# Patient Record
Sex: MALE | Race: WHITE | NOT HISPANIC OR LATINO | Employment: FULL TIME | URBAN - METROPOLITAN AREA
[De-identification: names, ages, dates, MRNs, and addresses within clinical notes are randomized per-mention and may not be internally consistent; named-entity substitution may affect disease eponyms.]

---

## 2017-04-13 ENCOUNTER — ALLSCRIPTS OFFICE VISIT (OUTPATIENT)
Dept: OTHER | Facility: OTHER | Age: 62
End: 2017-04-13

## 2017-04-17 ENCOUNTER — TRANSCRIBE ORDERS (OUTPATIENT)
Dept: ADMINISTRATIVE | Facility: HOSPITAL | Age: 62
End: 2017-04-17

## 2017-04-17 DIAGNOSIS — I25.10 ATHEROSCLEROSIS OF NATIVE CORONARY ARTERY OF NATIVE HEART, ANGINA PRESENCE UNSPECIFIED: Primary | ICD-10-CM

## 2017-05-08 ENCOUNTER — GENERIC CONVERSION - ENCOUNTER (OUTPATIENT)
Dept: OTHER | Facility: OTHER | Age: 62
End: 2017-05-08

## 2017-05-08 ENCOUNTER — HOSPITAL ENCOUNTER (OUTPATIENT)
Dept: NUCLEAR MEDICINE | Facility: HOSPITAL | Age: 62
Discharge: HOME | End: 2017-05-08
Payer: COMMERCIAL

## 2017-05-08 ENCOUNTER — HOSPITAL ENCOUNTER (OUTPATIENT)
Dept: NON INVASIVE DIAGNOSTICS | Facility: HOSPITAL | Age: 62
Discharge: HOME | End: 2017-05-08
Payer: COMMERCIAL

## 2017-05-08 DIAGNOSIS — I25.10 ATHEROSCLEROSIS OF NATIVE CORONARY ARTERY OF NATIVE HEART, ANGINA PRESENCE UNSPECIFIED: ICD-10-CM

## 2017-05-08 PROCEDURE — A9502 TC99M TETROFOSMIN: HCPCS

## 2017-05-08 PROCEDURE — 93017 CV STRESS TEST TRACING ONLY: CPT

## 2017-05-08 PROCEDURE — 78452 HT MUSCLE IMAGE SPECT MULT: CPT

## 2017-10-12 ENCOUNTER — ALLSCRIPTS OFFICE VISIT (OUTPATIENT)
Dept: OTHER | Facility: OTHER | Age: 62
End: 2017-10-12

## 2017-10-12 DIAGNOSIS — E78.5 HYPERLIPIDEMIA: ICD-10-CM

## 2017-10-12 DIAGNOSIS — Z12.5 ENCOUNTER FOR SCREENING FOR MALIGNANT NEOPLASM OF PROSTATE: ICD-10-CM

## 2017-10-12 DIAGNOSIS — I10 ESSENTIAL (PRIMARY) HYPERTENSION: ICD-10-CM

## 2017-10-12 DIAGNOSIS — I25.10 ATHEROSCLEROTIC HEART DISEASE OF NATIVE CORONARY ARTERY WITHOUT ANGINA PECTORIS: ICD-10-CM

## 2017-10-14 NOTE — PROGRESS NOTES
Assessment  1  Hypertension (401 9) (I10)   2  CAD (coronary atherosclerotic disease) (414 00) (I25 10)   3  Hyperlipidemia (272 4) (E78 5)    Plan  CAD (coronary atherosclerotic disease), Hyperlipidemia, Hypertension    · (1) COMPREHENSIVE METABOLIC PANEL; Status:Active; Requested for:12Oct2017;    · (1) LIPID PANEL, FASTING; Status:Active; Requested for:12Oct2017;    · (1) TSH; Status:Active; Requested for:12Oct2017;    · Follow-up Visit in 8 Months Evaluation and Treatment  Follow-up  Status: Hold For -  Scheduling  Requested for: 77OOV4521  Hyperlipidemia    · Simvastatin 80 MG Oral Tablet; TAKE ONE TABLET BY MOUTH ONCE DAILY   · A diet low in sugar may help your condition ; Status:Complete;   Done: 89NYR5235  06:04PM   · Begin or continue regular aerobic exercise  Gradually work up to at least 3 sessions of  30 minutes of exercise a week ; Status:Complete;   Done: 02ZYX0575 06:04PM   · Eat a low fat and low cholesterol diet ; Status:Complete;   Done: 81GPA8911 06:04PM   · Some eating tips that can help you lose weight ; Status:Complete;   Done: 67TLI0149  06:04PM  Hypertension    · Lisinopril 20 MG Oral Tablet; TAKE ONE TABLET BY MOUTH ONCE DAILY  Screening for colorectal cancer    · COLONOSCOPY; Status:Active; Requested for:12Oct2017;   Screening for prostate cancer    · (1) PSA (SCREEN) (Dx V76 44 Screen for Prostate Cancer); Status:Active; Requested  for:12Oct2017;     Discussion/Summary    1  HTN - BP is stable  Continue Lisinopril 20 mg daily  Follow a low-sodium diet, regular exercise  H/o MI in 2004 - patient is asymptomatic, had negative Myoview stress test in 5/17 statin, aspirin therapy  - continue Simvastatin 80 mg daily  Check labs  maintenance - Patient declined Flu shot, Pneumovax  to schedule colonoscopy  Check PSA  followup visit in 8 months     The patient was counseled regarding instructions for management,-- risk factor reductions,-- risks and benefits of treatment options,-- importance of compliance with treatment  total time of encounter was 25 minutes-- and-- 15 minutes was spent counseling  Possible side effects of new medications were reviewed with the patient/guardian today  The treatment plan was reviewed with the patient/guardian  The patient/guardian understands and agrees with the treatment plan      Chief Complaint  Patient presents for a 6 month followp HTN, CAD, Hyperlipidemia  Patient is here today for follow up of chronic conditions described in HPI  History of Present Illness  Patient is 58-year-old male with HTN, Hyperlipidemia, CAD, H/o MI in 2004, cardiac cath with stent placement  presents for 6 month followup visit  feels well  current medications  Patient requesting refills  - BP is stable  Currently taking Lisinopril 20 mg daily  Denies CP, SOB, dizziness  - patient was previously seen by cardiologist Dr Samantha Metzger  stress test done in 5/17: LV ejection fraction 66%, negative for cardiac ischemia  - patient takes Simvastatin 80 mg daily  No side effects from statin therapy Last blood test done in 2/17 at work    had colonoscopy over 10 years ago  family history of colon CA  Flu shot and Pneumovax vaccination today  Review of Systems    Constitutional: no fever,-- no chills-- and-- not feeling tired  Weight has been stable  Eyes: no eye pain,-- no dryness of the eyes,-- eyes not red,-- no purulent discharge from the eyes-- and-- no itching of the eyes  No visual disturbances  ENT: no earache,-- no nosebleeds,-- no sore throat,-- no hearing loss,-- no nasal discharge-- and-- no hoarseness  Cardiovascular: no chest pain,-- no intermittent leg claudication,-- no palpitations-- and-- no extremity edema  Respiratory: no shortness of breath,-- no cough,-- no wheezing-- and-- no shortness of breath during exertion  Gastrointestinal: no abdominal pain,-- no nausea,-- no vomiting,-- no constipation,-- no diarrhea-- and-- no blood in stools  Genitourinary: no dysuria,-- no incontinence-- and-- no nocturia  Musculoskeletal: no arthralgias,-- no joint swelling-- and-- no myalgias  Integumentary: no rashes,-- no itching-- and-- no skin wound  Neurological: no headache,-- no numbness,-- no tingling-- and-- no dizziness  Psychiatric: no anxiety,-- no sleep disturbances-- and-- no depression  Endocrine: no muscle weakness  Hematologic/Lymphatic: No complaints of swollen glands, no swollen glands in the neck, does not bleed easily, no easy bruising  Active Problems  1  CAD (coronary atherosclerotic disease) (414 00) (I25 10)   2  CAD S/P percutaneous coronary angioplasty (414 01,V45 82) (I25 10,Z98 61)   3  Disc degeneration, lumbar (722 52) (M51 36)   4  Hyperlipidemia (272 4) (E78 5)   5  Hypertension (401 9) (I10)   6  Lyme disease (088 81) (A69 20)   7  Screening for colorectal cancer (V76 51) (Z12 11,Z12 12)   8  Screening for prostate cancer (V76 44) (Z12 5)    Past Medical History  1  History of Acute Myocardial Infarction (V12 59)   2  History of Cough (786 2) (R05)   3  History of Hip pain, unspecified laterality   4  History of bronchitis (V12 69) (Z87 09)   5  History of eczema (V13 3) (Z87 2)   6  History of low back pain (V13 59) (Z87 39)   7  History of sciatica (V12 49) (Z86 69)   8  History of Lumbar radiculopathy (724 4) (M54 16)    The active problems and past medical history were reviewed and updated today  Surgical History  1  History of Appendectomy   2  History of Cath Stent Placement   3  History of Septoplasty    The surgical history was reviewed and updated today  Family History  Mother    1  Family history of Arthritis (V17 7)  Father    2  Family history of Dementia   3  Family history of Hypertension (V17 49)   4  Family history of Prostate Cancer (V16 42)   5  Family history of Stroke Syndrome (V17 1)  Maternal Uncle    6   Family history of coronary artery disease (V17 3) (Z82 49)    The family history was reviewed and updated today  Social History   · Being A Social Drinker   · Never A Smoker  The social history was reviewed and updated today  Current Meds   1  Aspirin 81 MG TABS; Therapy: (Recorded:13Apr2017) to Recorded   2  Lisinopril 20 MG Oral Tablet; TAKE ONE TABLET BY MOUTH ONCE DAILY; Therapy: 97CLH8989 to (Evaluate:14Oct2017)  Requested for: 13Apr2017; Last   Rx:[id number] Ordered   3  Multivital TABS; take 1 tablet by mouth daily; Therapy: 50RZG3148 to Recorded   4  Nitrostat 0 4 MG Sublingual Tablet Sublingual; PLACE 1 TABLET UNDER THE TONGUE   EVERY 5 MINUTES UP TO 3 DOSES AS NEEDED FOR CHEST PAIN;   Therapy: 17BJA0169 to (Evaluate:48Vun6203)  Requested for: 54TTR9656; Last   Rx:60Msm3600 Ordered   5  Simvastatin 80 MG Oral Tablet; TAKE ONE TABLET BY MOUTH ONCE DAILY; Therapy: 95WRK1147 to (Evaluate:14Oct2017)  Requested for: 13Apr2017; Last   Rx:[id number] Ordered    The medication list was reviewed and updated today  Allergies  1  Daypro TABS    Vitals  Vital Signs    Recorded: 91HBE6045 06:02PM Recorded: 07LFR0314 05:35PM   Temperature  98 F, Tympanic   Heart Rate  68, L Radial   Pulse Quality  Normal, L Radial   Respiration Quality  Normal   Respiration  16   Systolic 559 415, LUE, Sitting   Diastolic 78 74, LUE, Sitting   Height  5 ft 5 5 in   Weight  196 lb 2 oz   BMI Calculated  32 14   BSA Calculated  1 97   Pain Scale  0     Physical Exam    Constitutional   General appearance: No acute distress, well appearing and well nourished  -- Obese  Eyes   Conjunctiva and lids: No swelling, erythema, or discharge  Pupils and irises: Equal, round and reactive to light  Ears, Nose, Mouth, and Throat   External inspection of ears and nose: Normal     Otoscopic examination: Tympanic membrance translucent with normal light reflex  Canals patent without erythema  Oropharynx: Normal with no erythema, edema, exudate or lesions      Pulmonary   Respiratory effort: No increased work of breathing or signs of respiratory distress  Auscultation of lungs: Clear to auscultation, equal breath sounds bilaterally, no wheezes, no rales, no rhonci  Cardiovascular   Auscultation of heart: Normal rate and rhythm, normal S1 and S2, without murmurs  Examination of extremities for edema and/or varicosities: Normal     Carotid pulses: Normal  -- no carotid bruits  Abdomen   Abdomen: Non-tender, no masses  -- no abdominal bruits  Liver and spleen: No hepatomegaly or splenomegaly  Musculoskeletal   Gait and station: Normal     Digits and nails: Normal without clubbing or cyanosis  Inspection/palpation of joints, bones, and muscles: Normal     Skin   Skin and subcutaneous tissue: Normal without rashes or lesions      Psychiatric   Mood and affect: Normal          Results/Data  *VB-Depression Screening 58LDO4820 05:42PM Markus Godoy     Test Name Result Flag Reference   Depression Scale Result      Depression Screen - Negative For Symptoms       Signatures   Electronically signed by : RONA Vargas ; Oct 12 2017  6:07PM EST                       (Author)

## 2018-01-10 NOTE — RESULT NOTES
Verified Results  * NM MYOCARDIAL PERFUSION SPECT (STRESS AND/OR REST) [id number] 08:03AM [place]     Test Name Result Flag Reference   NM MYOCARDIAL PERFUSION SPECT (STRESS AND/OR REST) (Report)     [address]   [address]  [place], [address]   [phone]     Rest/Stress Gated SPECT Myocardial Perfusion Imaging After Exercise     Name: Gibran Alva   MR #: [id number]   Account #: [de-identified]   Study date: 2017   : 1955   Age: 64 years   Gender: Male   Height: 65 in   Weight: 196 lb   BSA: 1 96 m squared     Allergies: OXAPROZIN     Diagnosis: I25 10 - Atherosclerotic heart disease of native coronary artery without angina pectoris     Primary Physician: Krystal Hu MD   Technician: Nick Tavarez   RN: Rona Vail RN BSN   Referring Physician: Krystal Hu MD   [facility]   Report Prepared By[de-identified] Rona Vail RN BSN   Interpreting Physician: Camille Reyes DO     CLINICAL QUESTION: Evaluation of known coronary artery disease  HISTORY: The patient is a 64year old  male  Chest pain status: no chest pain  Coronary artery disease risk factors: dyslipidemia, hypertension, and family history of premature coronary artery disease  Cardiovascular history:   coronary artery disease and prior myocardial infarction  Prior cardiovascular procedures: percutaneous transluminal coronary angioplasty (on )  Medications: a nitrate, an ACE inhibitor/ARB, aspirin, and a lipid lowering agent  PHYSICAL EXAM: Baseline physical exam screening: normal lung exam      REST ECG: Normal sinus rhythm  Normal baseline ECG  PROCEDURE: Treadmill exercise testing was performed, using the Willie protocol  Gated SPECT myocardial perfusion imaging was performed after stress and at rest  Systolic blood pressure was 122 mmHg, at the start of the study  Diastolic   blood pressure was 65 mmHg, at the start of the study  The heart rate was 75 bpm, at the start of the study  IV double checked  OZIEL PROTOCOL:   HR bpm SBP mmHg DBP mmHg Symptoms   Baseline 75 122 65 none   Stage 1 106 142 57 none   Stage 2 118 151 62 none   Stage 3 136 157 65 mild dyspnea   Stage 4 142 169 58 fatigue   Recovery 1 111 178 53 subsiding   Recovery 2 95 153 60 none   No medications or fluids given  STRESS SUMMARY: Duration of exercise was 9 min and 30 sec  The patient exercised to protocol stage 4  Maximal work rate was 10 8 METs  Maximal heart rate during stress was 142 bpm ( 89 % of maximal predicted heart rate)  The heart rate   response to stress was normal  There was normal resting blood pressure with an appropriate response to stress  The rate-pressure product for the peak heart rate and blood pressure was 45721  There was no chest pain during stress  The   stress test was terminated due to achievement of maximal (symptom limited) exercise and achievement of target heart rate  Pre oxygen saturation: 99 %  Peak oxygen saturation: 95 %  The stress ECG was negative for ischemia  There were no   stress arrhythmias or conduction abnormalities  ISOTOPE ADMINISTRATION:   Radiopharmaceutical was administered 8 min, 30 sec into the stress protocol  There was 1 min of exercise after the injection  MYOCARDIAL PERFUSION IMAGING:   The image quality was fair  Left ventricular size was normal  The TID ratio was 1 01  PERFUSION DEFECTS:   - There were no perfusion defects  GATED SPECT:   The calculated left ventricular ejection fraction was 66 %  Left ventricular ejection fraction was within normal limits by visual estimate  There was no left ventricular regional abnormality  SUMMARY:   - Stress results: Duration of exercise was 9 min and 30 sec  Target heart rate was achieved  There was no chest pain during stress  - ECG conclusions: The stress ECG was negative for ischemia  - Perfusion imaging: There were no perfusion defects     - Gated SPECT: The calculated left ventricular ejection fraction was 66 %  Left ventricular ejection fraction was within normal limits by visual estimate  There was no left ventricular regional abnormality  IMPRESSIONS: Normal study after maximal exercise  Myocardial perfusion imaging was normal at rest and with stress   Left ventricular systolic function was normal      Prepared and signed by     Nessa Agustin DO   Signed 05/08/2017 15:54:47

## 2018-01-14 VITALS
WEIGHT: 196.13 LBS | DIASTOLIC BLOOD PRESSURE: 78 MMHG | TEMPERATURE: 98 F | HEART RATE: 68 BPM | HEIGHT: 66 IN | BODY MASS INDEX: 31.52 KG/M2 | SYSTOLIC BLOOD PRESSURE: 132 MMHG | RESPIRATION RATE: 16 BRPM

## 2018-01-14 VITALS
SYSTOLIC BLOOD PRESSURE: 130 MMHG | HEART RATE: 78 BPM | TEMPERATURE: 97.6 F | DIASTOLIC BLOOD PRESSURE: 80 MMHG | RESPIRATION RATE: 16 BRPM | HEIGHT: 66 IN | WEIGHT: 196 LBS | BODY MASS INDEX: 31.5 KG/M2

## 2018-06-14 ENCOUNTER — OFFICE VISIT (OUTPATIENT)
Dept: FAMILY MEDICINE CLINIC | Facility: CLINIC | Age: 63
End: 2018-06-14
Payer: COMMERCIAL

## 2018-06-14 VITALS
WEIGHT: 194 LBS | HEART RATE: 72 BPM | DIASTOLIC BLOOD PRESSURE: 72 MMHG | RESPIRATION RATE: 16 BRPM | SYSTOLIC BLOOD PRESSURE: 126 MMHG | TEMPERATURE: 98 F | BODY MASS INDEX: 31.18 KG/M2 | HEIGHT: 66 IN

## 2018-06-14 DIAGNOSIS — Z98.61 CAD S/P PERCUTANEOUS CORONARY ANGIOPLASTY: ICD-10-CM

## 2018-06-14 DIAGNOSIS — E78.2 MIXED HYPERLIPIDEMIA: ICD-10-CM

## 2018-06-14 DIAGNOSIS — Z12.5 SCREENING FOR PROSTATE CANCER: ICD-10-CM

## 2018-06-14 DIAGNOSIS — Z12.11 SCREENING FOR COLON CANCER: ICD-10-CM

## 2018-06-14 DIAGNOSIS — I25.10 CAD S/P PERCUTANEOUS CORONARY ANGIOPLASTY: ICD-10-CM

## 2018-06-14 DIAGNOSIS — I10 ESSENTIAL HYPERTENSION: Primary | ICD-10-CM

## 2018-06-14 PROCEDURE — 3008F BODY MASS INDEX DOCD: CPT | Performed by: FAMILY MEDICINE

## 2018-06-14 PROCEDURE — 99214 OFFICE O/P EST MOD 30 MIN: CPT | Performed by: FAMILY MEDICINE

## 2018-06-14 PROCEDURE — 1036F TOBACCO NON-USER: CPT | Performed by: FAMILY MEDICINE

## 2018-06-14 PROCEDURE — 3078F DIAST BP <80 MM HG: CPT | Performed by: FAMILY MEDICINE

## 2018-06-14 PROCEDURE — 3074F SYST BP LT 130 MM HG: CPT | Performed by: FAMILY MEDICINE

## 2018-06-14 RX ORDER — NITROGLYCERIN 0.4 MG/1
1 TABLET SUBLINGUAL
COMMUNITY
Start: 2013-05-21

## 2018-06-14 RX ORDER — SIMVASTATIN 80 MG
1 TABLET ORAL DAILY
COMMUNITY
Start: 2011-06-14 | End: 2018-11-26 | Stop reason: SDUPTHER

## 2018-06-14 RX ORDER — LISINOPRIL 20 MG/1
1 TABLET ORAL DAILY
COMMUNITY
Start: 2011-07-25 | End: 2018-11-26 | Stop reason: SDUPTHER

## 2018-06-14 NOTE — ASSESSMENT & PLAN NOTE
Continue Simvastatin 80 mg daily  Check CMP, lipid panel  Follow a low-cholesterol diet, regular exercise

## 2018-06-14 NOTE — ASSESSMENT & PLAN NOTE
Patient is asymptomatic  Myoview sterss  test done in May 2017 showed no evidence of cardiac ischemia  Continue aspirin 81 mg daily, statin

## 2018-11-26 DIAGNOSIS — E78.2 MIXED HYPERLIPIDEMIA: Primary | ICD-10-CM

## 2018-11-26 DIAGNOSIS — I10 ESSENTIAL HYPERTENSION: ICD-10-CM

## 2018-11-26 RX ORDER — LISINOPRIL 20 MG/1
20 TABLET ORAL DAILY
Qty: 90 TABLET | Refills: 2 | Status: SHIPPED | OUTPATIENT
Start: 2018-11-26 | End: 2019-08-06 | Stop reason: SDUPTHER

## 2018-11-26 RX ORDER — SIMVASTATIN 80 MG
80 TABLET ORAL DAILY
Qty: 90 TABLET | Refills: 2 | Status: SHIPPED | OUTPATIENT
Start: 2018-11-26 | End: 2019-08-06 | Stop reason: SDUPTHER

## 2018-11-26 NOTE — TELEPHONE ENCOUNTER
Refill Lisinopril 20 mg  1 daily  #90           And Simvastatin 80 m g 1 daily #90  [facility]

## 2019-03-25 ENCOUNTER — TELEPHONE (OUTPATIENT)
Dept: FAMILY MEDICINE CLINIC | Facility: CLINIC | Age: 64
End: 2019-03-25

## 2019-03-25 DIAGNOSIS — I25.10 CAD S/P PERCUTANEOUS CORONARY ANGIOPLASTY: Primary | ICD-10-CM

## 2019-03-25 DIAGNOSIS — Z98.61 CAD S/P PERCUTANEOUS CORONARY ANGIOPLASTY: Primary | ICD-10-CM

## 2019-03-25 NOTE — TELEPHONE ENCOUNTER
Left message letting patient know order was placed  Mailed slip to him, and asked him to call us back to let us know if authorization is required so we can start it

## 2019-03-25 NOTE — TELEPHONE ENCOUNTER
Patient requests order for stress test  States he is to have one every 2 years  Cardiologist was Dr Barrington Foss but told to have PCP manage

## 2019-03-25 NOTE — TELEPHONE ENCOUNTER
Please call patient  Order placed in chart for nuclear stress test   Check if patient needs prior authorization

## 2019-04-01 ENCOUNTER — TELEPHONE (OUTPATIENT)
Dept: FAMILY MEDICINE CLINIC | Facility: CLINIC | Age: 64
End: 2019-04-01

## 2019-06-20 ENCOUNTER — OFFICE VISIT (OUTPATIENT)
Dept: FAMILY MEDICINE CLINIC | Facility: CLINIC | Age: 64
End: 2019-06-20
Payer: COMMERCIAL

## 2019-06-20 VITALS
HEART RATE: 76 BPM | WEIGHT: 188.4 LBS | BODY MASS INDEX: 30.41 KG/M2 | DIASTOLIC BLOOD PRESSURE: 70 MMHG | RESPIRATION RATE: 16 BRPM | SYSTOLIC BLOOD PRESSURE: 130 MMHG | TEMPERATURE: 99.5 F | OXYGEN SATURATION: 97 %

## 2019-06-20 DIAGNOSIS — I25.10 CAD S/P PERCUTANEOUS CORONARY ANGIOPLASTY: ICD-10-CM

## 2019-06-20 DIAGNOSIS — E66.9 MILD OBESITY: ICD-10-CM

## 2019-06-20 DIAGNOSIS — Z91.89 ENCOUNTER FOR HEPATITIS C VIRUS SCREENING TEST FOR HIGH RISK PATIENT: ICD-10-CM

## 2019-06-20 DIAGNOSIS — E78.2 MIXED HYPERLIPIDEMIA: ICD-10-CM

## 2019-06-20 DIAGNOSIS — I10 ESSENTIAL HYPERTENSION: Primary | ICD-10-CM

## 2019-06-20 DIAGNOSIS — Z11.59 ENCOUNTER FOR HEPATITIS C VIRUS SCREENING TEST FOR HIGH RISK PATIENT: ICD-10-CM

## 2019-06-20 DIAGNOSIS — Z12.5 SCREENING FOR PROSTATE CANCER: ICD-10-CM

## 2019-06-20 DIAGNOSIS — Z12.11 SCREENING FOR COLON CANCER: ICD-10-CM

## 2019-06-20 DIAGNOSIS — Z98.61 CAD S/P PERCUTANEOUS CORONARY ANGIOPLASTY: ICD-10-CM

## 2019-06-20 PROBLEM — E66.811 OBESITY (BMI 30.0-34.9): Status: ACTIVE | Noted: 2019-06-20

## 2019-06-20 PROCEDURE — 1036F TOBACCO NON-USER: CPT | Performed by: FAMILY MEDICINE

## 2019-06-20 PROCEDURE — 99214 OFFICE O/P EST MOD 30 MIN: CPT | Performed by: FAMILY MEDICINE

## 2019-06-20 PROCEDURE — 3078F DIAST BP <80 MM HG: CPT | Performed by: FAMILY MEDICINE

## 2019-06-20 PROCEDURE — 3075F SYST BP GE 130 - 139MM HG: CPT | Performed by: FAMILY MEDICINE

## 2019-08-06 DIAGNOSIS — I10 ESSENTIAL HYPERTENSION: ICD-10-CM

## 2019-08-06 DIAGNOSIS — E78.2 MIXED HYPERLIPIDEMIA: ICD-10-CM

## 2019-08-06 RX ORDER — SIMVASTATIN 80 MG
TABLET ORAL
Qty: 90 TABLET | Refills: 0 | Status: SHIPPED | OUTPATIENT
Start: 2019-08-06

## 2019-08-06 RX ORDER — LISINOPRIL 20 MG/1
TABLET ORAL
Qty: 90 TABLET | Refills: 0 | Status: SHIPPED | OUTPATIENT
Start: 2019-08-06 | End: 2019-11-21 | Stop reason: SDUPTHER

## 2019-08-07 DIAGNOSIS — I10 ESSENTIAL HYPERTENSION: ICD-10-CM

## 2019-08-07 DIAGNOSIS — E78.2 MIXED HYPERLIPIDEMIA: ICD-10-CM

## 2019-08-07 RX ORDER — LISINOPRIL 20 MG/1
TABLET ORAL
Qty: 90 TABLET | Refills: 2 | OUTPATIENT
Start: 2019-08-07

## 2019-08-07 RX ORDER — SIMVASTATIN 80 MG
TABLET ORAL
Qty: 90 TABLET | Refills: 2 | OUTPATIENT
Start: 2019-08-07

## 2019-11-21 DIAGNOSIS — I10 ESSENTIAL HYPERTENSION: ICD-10-CM

## 2019-11-21 RX ORDER — LISINOPRIL 20 MG/1
20 TABLET ORAL DAILY
Qty: 90 TABLET | Refills: 3 | Status: SHIPPED | OUTPATIENT
Start: 2019-11-21 | End: 2020-11-16

## 2019-11-21 NOTE — TELEPHONE ENCOUNTER
Patient is requesting a refill of lisinopril (ZESTRIL) 20 mg tablet, take 1 daily, 90-day supply to [facility]  Patient can be contacted at [phone] with any questions

## 2020-03-31 ENCOUNTER — TELEPHONE (OUTPATIENT)
Dept: GASTROENTEROLOGY | Facility: MEDICAL CENTER | Age: 65
End: 2020-03-31

## 2020-06-09 ENCOUNTER — CONSULT (OUTPATIENT)
Dept: GASTROENTEROLOGY | Facility: MEDICAL CENTER | Age: 65
End: 2020-06-09
Payer: COMMERCIAL

## 2020-06-09 VITALS
HEART RATE: 80 BPM | HEIGHT: 66 IN | SYSTOLIC BLOOD PRESSURE: 148 MMHG | DIASTOLIC BLOOD PRESSURE: 85 MMHG | WEIGHT: 187 LBS | BODY MASS INDEX: 30.05 KG/M2 | TEMPERATURE: 98.8 F

## 2020-06-09 DIAGNOSIS — Z11.59 SPECIAL SCREENING EXAMINATION FOR UNSPECIFIED VIRAL DISEASE: ICD-10-CM

## 2020-06-09 DIAGNOSIS — I10 ESSENTIAL HYPERTENSION: ICD-10-CM

## 2020-06-09 DIAGNOSIS — I25.10 CAD S/P PERCUTANEOUS CORONARY ANGIOPLASTY: ICD-10-CM

## 2020-06-09 DIAGNOSIS — Z12.11 SCREENING FOR COLON CANCER: Primary | ICD-10-CM

## 2020-06-09 DIAGNOSIS — Z98.61 CAD S/P PERCUTANEOUS CORONARY ANGIOPLASTY: ICD-10-CM

## 2020-06-09 PROCEDURE — 99243 OFF/OP CNSLTJ NEW/EST LOW 30: CPT | Performed by: INTERNAL MEDICINE

## 2020-06-09 PROCEDURE — 3008F BODY MASS INDEX DOCD: CPT | Performed by: INTERNAL MEDICINE

## 2020-06-09 PROCEDURE — 3077F SYST BP >= 140 MM HG: CPT | Performed by: INTERNAL MEDICINE

## 2020-06-09 PROCEDURE — 3079F DIAST BP 80-89 MM HG: CPT | Performed by: INTERNAL MEDICINE

## 2020-06-11 PROBLEM — Z12.11 SCREENING FOR COLON CANCER: Status: ACTIVE | Noted: 2020-06-11

## 2020-06-11 PROBLEM — Z11.59 SPECIAL SCREENING EXAMINATION FOR UNSPECIFIED VIRAL DISEASE: Status: ACTIVE | Noted: 2020-06-11

## 2020-06-29 ENCOUNTER — TELEPHONE (OUTPATIENT)
Dept: GASTROENTEROLOGY | Facility: MEDICAL CENTER | Age: 65
End: 2020-06-29

## 2020-07-09 PROBLEM — Z00.00 WELL ADULT EXAM: Status: ACTIVE | Noted: 2020-06-11

## 2020-07-22 DIAGNOSIS — Z12.11 SCREENING FOR COLON CANCER: ICD-10-CM

## 2020-07-22 DIAGNOSIS — Z11.59 SPECIAL SCREENING EXAMINATION FOR UNSPECIFIED VIRAL DISEASE: ICD-10-CM

## 2020-07-22 PROCEDURE — U0003 INFECTIOUS AGENT DETECTION BY NUCLEIC ACID (DNA OR RNA); SEVERE ACUTE RESPIRATORY SYNDROME CORONAVIRUS 2 (SARS-COV-2) (CORONAVIRUS DISEASE [COVID-19]), AMPLIFIED PROBE TECHNIQUE, MAKING USE OF HIGH THROUGHPUT TECHNOLOGIES AS DESCRIBED BY CMS-2020-01-R: HCPCS

## 2020-07-23 LAB — SARS-COV-2 RNA SPEC QL NAA+PROBE: NOT DETECTED

## 2020-07-28 ENCOUNTER — ANESTHESIA EVENT (OUTPATIENT)
Dept: GASTROENTEROLOGY | Facility: MEDICAL CENTER | Age: 65
End: 2020-07-28

## 2020-07-30 ENCOUNTER — ANESTHESIA (OUTPATIENT)
Dept: GASTROENTEROLOGY | Facility: MEDICAL CENTER | Age: 65
End: 2020-07-30

## 2020-07-30 ENCOUNTER — HOSPITAL ENCOUNTER (OUTPATIENT)
Dept: GASTROENTEROLOGY | Facility: MEDICAL CENTER | Age: 65
Setting detail: OUTPATIENT SURGERY
Discharge: HOME | End: 2020-07-30
Attending: INTERNAL MEDICINE
Payer: COMMERCIAL

## 2020-07-30 VITALS
BODY MASS INDEX: 30.53 KG/M2 | HEART RATE: 65 BPM | RESPIRATION RATE: 16 BRPM | HEIGHT: 66 IN | DIASTOLIC BLOOD PRESSURE: 74 MMHG | WEIGHT: 190 LBS | OXYGEN SATURATION: 97 % | TEMPERATURE: 98.4 F | SYSTOLIC BLOOD PRESSURE: 132 MMHG

## 2020-07-30 DIAGNOSIS — Z12.11 SCREENING FOR COLON CANCER: ICD-10-CM

## 2020-07-30 DIAGNOSIS — Z11.59 SPECIAL SCREENING EXAMINATION FOR UNSPECIFIED VIRAL DISEASE: ICD-10-CM

## 2020-07-30 PROCEDURE — 45385 COLONOSCOPY W/LESION REMOVAL: CPT | Performed by: INTERNAL MEDICINE

## 2020-07-30 PROCEDURE — 88342 IMHCHEM/IMCYTCHM 1ST ANTB: CPT | Performed by: PATHOLOGY

## 2020-07-30 PROCEDURE — 88305 TISSUE EXAM BY PATHOLOGIST: CPT | Performed by: PATHOLOGY

## 2020-07-30 RX ORDER — SODIUM CHLORIDE 9 MG/ML
125 INJECTION, SOLUTION INTRAVENOUS CONTINUOUS
Status: DISCONTINUED | OUTPATIENT
Start: 2020-07-30 | End: 2020-08-03 | Stop reason: HOSPADM

## 2020-07-30 RX ORDER — PROPOFOL 10 MG/ML
INJECTION, EMULSION INTRAVENOUS AS NEEDED
Status: DISCONTINUED | OUTPATIENT
Start: 2020-07-30 | End: 2020-07-30 | Stop reason: SURG

## 2020-07-30 RX ORDER — PROPOFOL 10 MG/ML
INJECTION, EMULSION INTRAVENOUS CONTINUOUS PRN
Status: DISCONTINUED | OUTPATIENT
Start: 2020-07-30 | End: 2020-07-30 | Stop reason: SURG

## 2020-07-30 RX ORDER — LIDOCAINE HYDROCHLORIDE 20 MG/ML
INJECTION, SOLUTION EPIDURAL; INFILTRATION; INTRACAUDAL; PERINEURAL AS NEEDED
Status: DISCONTINUED | OUTPATIENT
Start: 2020-07-30 | End: 2020-07-30 | Stop reason: SURG

## 2020-07-30 RX ADMIN — LIDOCAINE HYDROCHLORIDE 5 ML: 20 INJECTION, SOLUTION EPIDURAL; INFILTRATION; INTRACAUDAL at 14:01

## 2020-07-30 RX ADMIN — SODIUM CHLORIDE 125 ML/HR: 0.9 INJECTION, SOLUTION INTRAVENOUS at 14:07

## 2020-07-30 RX ADMIN — Medication 40 MG: at 14:17

## 2020-07-30 RX ADMIN — PROPOFOL 120 MG: 10 INJECTION, EMULSION INTRAVENOUS at 14:01

## 2020-07-30 RX ADMIN — PROPOFOL 150 MCG/KG/MIN: 10 INJECTION, EMULSION INTRAVENOUS at 14:01

## 2020-07-30 NOTE — ANESTHESIA POSTPROCEDURE EVALUATION
Post-Op Assessment Note    CV Status:  Stable    Pain management: adequate     Mental Status:  Alert and awake   Hydration Status:  Euvolemic   PONV Controlled:  Controlled   Airway Patency:  Patent   Post Op Vitals Reviewed: Yes      Staff: Anesthesiologist           /74 (07/30/20 1436)    Temp      Pulse 65 (07/30/20 1436)   Resp 16 (07/30/20 1436)    SpO2 97 % (07/30/20 1436)

## 2020-07-30 NOTE — ANESTHESIA PREPROCEDURE EVALUATION
Review of Systems/Medical History  Patient summary reviewed  Chart reviewed      Cardiovascular  Hyperlipidemia, Hypertension , Past MI , CAD ,    Pulmonary  Smoker ex-smoker  ,        GI/Hepatic    Bowel prep            Endo/Other     GYN       Hematology  Negative hematology ROS      Musculoskeletal  Negative musculoskeletal ROS        Neurology  Negative neurology ROS      Psychology   Negative psychology ROS              Physical Exam    Airway    Mallampati score: I  TM Distance: <3 FB  Neck ROM: full     Dental       Cardiovascular  Rhythm: regular, Rate: normal, Cardiovascular exam normal    Pulmonary  Pulmonary exam normal     Other Findings        Anesthesia Plan  ASA Score- 3     Anesthesia Type- IV sedation with anesthesia with ASA Monitors  Additional Monitors:   Airway Plan:         Plan Factors- Patient instructed to abstain from smoking on day of procedure  Patient did not smoke on day of surgery  Induction- intravenous  Postoperative Plan-     Informed Consent- Anesthetic plan and risks discussed with patient

## 2020-07-30 NOTE — DISCHARGE INSTRUCTIONS
Colonoscopy   WHAT YOU NEED TO KNOW:   A colonoscopy is a procedure to examine the inside of your colon (intestine) with a scope  Polyps or tissue growths may have been removed during your colonoscopy  It is normal to feel bloated and to have some abdominal discomfort  You should be passing gas  If you have hemorrhoids or you had polyps removed, you may have a small amount of bleeding  DISCHARGE INSTRUCTIONS:   Seek care immediately if:   · You have a large amount of bright red blood in your bowel movements  · Your abdomen is hard and firm and you have severe pain  · You have sudden trouble breathing  Contact your healthcare provider if:   · You develop a rash or hives  · You have a fever within 24 hours of your procedure       · You have not had a bowel movement for 3 days after your procedure  · You have questions or concerns about your condition or care  Activity:   · Do not lift, strain, or run  for 3 days after your procedure  · Rest after your procedure  You have been given medicine to relax you  Do not  drive or make important decisions until the day after your procedure  Return to your normal activity as directed  · Relieve gas and discomfort from bloating  by lying on your right side with a heating pad on your abdomen  You may need to take short walks to help the gas move out  Eat small meals until bloating is relieved  If you had polyps removed: For 7 days after your procedure:  · Do not  take aspirin  · Do not  go on long car rides  Follow up with your healthcare provider as directed:  Write down your questions so you remember to ask them during your visits  © 2017 [address] [employer] [address] [employer] is for End User's use only and may not be sold, redistributed or otherwise used for commercial purposes  All illustrations and images included in CareNotes® are the copyrighted property of [employer]  or Riccardo Pelayo    The above information is an  only  It is not intended as medical advice for individual conditions or treatments  Talk to your doctor, nurse or pharmacist before following any medical regimen to see if it is safe and effective for you  Colorectal Polyps   WHAT YOU NEED TO KNOW:   Colorectal polyps are small growths of tissue in the lining of the colon and rectum  Most polyps are hyperplastic polyps and are usually benign (noncancerous)  Certain types of polyps, called adenomatous polyps, may turn into cancer  DISCHARGE INSTRUCTIONS:   Follow up with your healthcare provider or gastroenterologist as directed: You may need to return for more tests, such as another colonoscopy  Write down your questions so you remember to ask them during your visits  Reduce your risk for colorectal polyps:   · Eat a variety of healthy foods:  Healthy foods include fruit, vegetables, whole-grain breads, low-fat dairy products, beans, lean meat, and fish  Ask if you need to be on a special diet  · Maintain a healthy weight:  Ask your healthcare provider if you need to lose weight and how much you need to lose  Ask for help with a weight loss program     · Exercise:  Begin to exercise slowly and do more as you get stronger  Talk with your healthcare provider before you start an exercise program      · Limit alcohol:  Your risk for polyps increases the more you drink  · Do not smoke: If you smoke, it is never too late to quit  Ask for information about how to stop  For support and more information:   · Jaron Birmingham ([facility]) [address], [place] [address]  Phone: 0- 644 - [phone]  Web Address: www digestive  niddk nih gov  Contact your healthcare provider or gastroenterologist if:   · You have a fever  · You have chills, a cough, or feel weak and achy  · You have abdominal pain that does not go away or gets worse after you take medicine  · Your abdomen is swollen  · You are losing weight without trying  · You have questions or concerns about your condition or care  Seek care immediately or call 911 if:   · You have sudden shortness of breath  · You have a fast heart rate, fast breathing, or are too dizzy to stand up  · You have severe abdominal pain  · You see blood in your bowel movement  © 2017 [address] [employer] Information is for End User's use only and may not be sold, redistributed or otherwise used for commercial purposes  All illustrations and images included in CareNotes® are the copyrighted property of [employer]  or Riccardo Pelayo  The above information is an  only  It is not intended as medical advice for individual conditions or treatments  Talk to your doctor, nurse or pharmacist before following any medical regimen to see if it is safe and effective for you

## 2020-07-30 NOTE — H&P
History and Physical -  [facility]  Quirino Zhang 59 y o  male MRN: [id number]                  HPI: Quirino Zhang is a 59y o  year old male who presents for screening colonoscopy  REVIEW OF SYSTEMS: Per the HPI, and otherwise unremarkable  Historical Information   Past Medical History:   Diagnosis Date    Acute myocardial infarction ([facility] [address] )     CAD (coronary artery disease)     Coronary artery disease     Hyperlipidemia     Hypertension     Lumbar radiculopathy     Right L1-2 and L2-3 Last assessed: 2/4/13    Myocardial infarction [facility]) 2004    Sciatica     Last assessed: 11/5/12     Past Surgical History:   Procedure Laterality Date    APPENDECTOMY      Resolved: 1974    COLONOSCOPY      CORONARY ANGIOPLASTY WITH STENT PLACEMENT      Resolved: 2004    SEPTOPLASTY      Resolved: 12     Social History   Social History     Substance and Sexual Activity   Alcohol Use Yes    Comment: Social     Social History     Substance and Sexual Activity   Drug Use No     Social History     Tobacco Use   Smoking Status Former Smoker   Smokeless Tobacco Never Used     Family History   Problem Relation Age of Onset    Arthritis Mother     Dementia Father     Hypertension Father     Prostate cancer Father     Stroke Father         Syndrome    Coronary artery disease Maternal Uncle        Meds/Allergies       (Not in a hospital admission)    Allergies   Allergen Reactions    Daypro [Oxaprozin]        Objective     /86   Pulse 75   Temp 98 4 °F (36 9 °C) (Temporal)   Resp 16   Ht 5' 6" (1 676 m)   Wt 86 2 kg (190 lb)   SpO2 97%   BMI 30 67 kg/m²       PHYSICAL EXAM    Gen: NAD  CV: RRR  CHEST: Clear  ABD: soft, NT/ND  EXT: no edema      ASSESSMENT/PLAN:  This is a 59y o  year old male here for screening colonoscopy and he is stable and optimized for his procedure

## 2020-11-14 DIAGNOSIS — I10 ESSENTIAL HYPERTENSION: ICD-10-CM

## 2020-11-16 RX ORDER — LISINOPRIL 20 MG/1
TABLET ORAL
Qty: 90 TABLET | Refills: 3 | Status: SHIPPED | OUTPATIENT
Start: 2020-11-16 | End: 2021-11-10

## 2021-03-22 ENCOUNTER — TELEPHONE (OUTPATIENT)
Dept: FAMILY MEDICINE CLINIC | Facility: CLINIC | Age: 66
End: 2021-03-22

## 2021-03-22 DIAGNOSIS — B34.9 VIRAL INFECTION, UNSPECIFIED: ICD-10-CM

## 2021-03-22 DIAGNOSIS — B34.9 VIRAL INFECTION, UNSPECIFIED: Primary | ICD-10-CM

## 2021-03-22 PROCEDURE — U0003 INFECTIOUS AGENT DETECTION BY NUCLEIC ACID (DNA OR RNA); SEVERE ACUTE RESPIRATORY SYNDROME CORONAVIRUS 2 (SARS-COV-2) (CORONAVIRUS DISEASE [COVID-19]), AMPLIFIED PROBE TECHNIQUE, MAKING USE OF HIGH THROUGHPUT TECHNOLOGIES AS DESCRIBED BY CMS-2020-01-R: HCPCS | Performed by: FAMILY MEDICINE

## 2021-03-22 PROCEDURE — U0005 INFEC AGEN DETEC AMPLI PROBE: HCPCS | Performed by: FAMILY MEDICINE

## 2021-03-22 NOTE — TELEPHONE ENCOUNTER
Patient phoned again to state his current temperature is 101-please call patient at [phone] when the script is placed

## 2021-03-22 NOTE — TELEPHONE ENCOUNTER
Patient states he was in [place] on Friday and he is now not feeling well  Has headache  Temp 99 some congestion     No other symptoms   Would like order for covid test

## 2021-03-23 LAB — SARS-COV-2 RNA RESP QL NAA+PROBE: NEGATIVE

## 2021-04-29 ENCOUNTER — OFFICE VISIT (OUTPATIENT)
Dept: FAMILY MEDICINE CLINIC | Facility: CLINIC | Age: 66
End: 2021-04-29
Payer: OTHER MISCELLANEOUS

## 2021-04-29 VITALS
DIASTOLIC BLOOD PRESSURE: 84 MMHG | OXYGEN SATURATION: 96 % | TEMPERATURE: 99.4 F | HEART RATE: 80 BPM | RESPIRATION RATE: 14 BRPM | WEIGHT: 189 LBS | SYSTOLIC BLOOD PRESSURE: 148 MMHG | BODY MASS INDEX: 30.37 KG/M2 | HEIGHT: 66 IN

## 2021-04-29 DIAGNOSIS — Z01.818 PREOP GENERAL PHYSICAL EXAM: Primary | ICD-10-CM

## 2021-04-29 DIAGNOSIS — S46.011S TRAUMATIC TEAR OF RIGHT ROTATOR CUFF, UNSPECIFIED TEAR EXTENT, SEQUELA: ICD-10-CM

## 2021-04-29 DIAGNOSIS — E66.9 OBESITY (BMI 30.0-34.9): ICD-10-CM

## 2021-04-29 DIAGNOSIS — I10 ESSENTIAL HYPERTENSION: ICD-10-CM

## 2021-04-29 DIAGNOSIS — E78.2 MIXED HYPERLIPIDEMIA: ICD-10-CM

## 2021-04-29 DIAGNOSIS — Z98.61 CAD S/P PERCUTANEOUS CORONARY ANGIOPLASTY: ICD-10-CM

## 2021-04-29 DIAGNOSIS — I25.10 CAD S/P PERCUTANEOUS CORONARY ANGIOPLASTY: ICD-10-CM

## 2021-04-29 PROBLEM — S46.011A TRAUMATIC TEAR OF RIGHT ROTATOR CUFF: Status: ACTIVE | Noted: 2021-04-29

## 2021-04-29 PROBLEM — M75.101 TEAR OF RIGHT ROTATOR CUFF: Status: ACTIVE | Noted: 2021-04-29

## 2021-04-29 PROCEDURE — 99214 OFFICE O/P EST MOD 30 MIN: CPT | Performed by: FAMILY MEDICINE

## 2021-04-29 RX ORDER — OMEGA-3/DHA/EPA/FISH OIL 300-1000MG
2 CAPSULE ORAL DAILY
COMMUNITY

## 2021-04-29 NOTE — PROGRESS NOTES
Chief Complaint   Patient presents with    Pre-op Exam     5/11/21 RIGHT SHOULDER ARTHROSCOPY, ROTATOR CUFF REPAIR     Health Maintenance   Topic Date Due    Hepatitis C Screening  Never done    HIV Screening  Never done    COVID-19 Vaccine (1) Never done    Annual Physical  Never done    BMI: Followup Plan  06/20/2020    Pneumococcal Vaccine: 65+ Years (1 of 1 - PPSV23) Never done    Influenza Vaccine (1) Never done    Fall Risk  04/29/2022    Depression Screening PHQ  04/29/2022    BMI: Adult  04/29/2022    Colonoscopy Surveillance  07/30/2023    DTaP,Tdap,and Td Vaccines (3 - Td) 07/09/2025    Colorectal Cancer Screening  07/30/2030    HIB Vaccine  Aged Out    Hepatitis B Vaccine  Aged Out    IPV Vaccine  Aged Out    Hepatitis A Vaccine  Aged Out    Meningococcal ACWY Vaccine  Aged Out    HPV Vaccine  Aged Out         BMI Counseling: Body mass index is 30 51 kg/m²  The BMI is above normal  Nutrition recommendations include decreasing portion sizes, encouraging healthy choices of fruits and vegetables, decreasing fast food intake, consuming healthier snacks, limiting drinks that contain sugar, moderation in carbohydrate intake, increasing intake of lean protein, reducing intake of saturated and trans fat and reducing intake of cholesterol  Exercise recommendations include exercising 3-5 times per week  No pharmacotherapy was ordered  Assessment/Plan:    Preop general physical exam  Cardiopulmonary status is stable  Patient is medically stable to proceed with right shoulder arthroscopic surgery for rotator cuff repair,  subacromial decompression, debridement, biceps tenodesis scheduled on May 11, 2021 with Dr Pak  Patient had cardiac clearance done by cardiologist Dr Naveed Olson yesterday  Recommended to stop Aspirin, NSAID's, Fish oil 1 week prior to surgery  Hypertension  Continue Lisinopril 20 mg daily  Follow a low-sodium diet, regular exercise      CAD S/P percutaneous coronary angioplasty  Patient is asymptomatic  On statin, ASA therapy  Follow-up with cardiology Dr Dk Disla  Hyperlipidemia  Continue Simvastatin 80 mg daily  Obesity (BMI 30 0-34  9)  Encouraged to work on weight reduction  Diagnoses and all orders for this visit:    Preop general physical exam    Essential hypertension    CAD S/P percutaneous coronary angioplasty    Mixed hyperlipidemia    Obesity (BMI 30 0-34  9)    Traumatic tear of right rotator cuff, unspecified tear extent, sequela    Other orders  -     fish oil-omega-3 fatty acids 1000 MG capsule; Take 2 g by mouth daily          Subjective:      Patient ID: Anand Farah is a 72 y o  male  HPI     Patient presents for pre-op clearance prior to right shoulder arthroscopic surgery for rotator cuff repair,  subacromial decompression, debridement, biceps tenodesis scheduled on May 11, 2021 with Dr Pak  Patient injured his right shoulder at work this month  C/o decreased range of motion in right shoulder  Takes Ibuprofen PRN  Patient had pre-admission testing done on April 23, 2021  Creatinine 0 86, potassium 4 1, glucose 78  LFTs - within normal range  CBC with dif -normal   PT 12 3, INR 1 0  Patient denies allergy to anesthesia drugs  Reports no easy bruising or bleeding  PMHx: HTN, Hyperlipidemia, CAD, H/o MI in 2004, cardiac cath with stent placement, mild obesity  Patient was seen by cardiologist Dr Milagros Sandoval for cardiac clearance yesterday and was cleared for surgery  EKG done at cardiology office was normal       Reviewed current medications  HTN/ CAD - patient takes Lisinopril 20 mg daily, Simvastatin 80 mg daily, Co Q10  Denies chest pain, shortness of breath, dizziness  Patient had negative exercise stress test in June 2020  Quit smoking in 1990      The following portions of the patient's history were reviewed and updated as appropriate: allergies, past family history, past medical history, past social history, past surgical history and problem list     Review of Systems   Constitutional: Negative for activity change, appetite change, chills, fatigue and fever  HENT: Negative for congestion, ear pain, mouth sores, nosebleeds, sore throat, tinnitus and trouble swallowing  Eyes: Negative for pain, discharge, redness, itching and visual disturbance  Respiratory: Negative for cough, chest tightness and wheezing  Cardiovascular: Negative for chest pain, palpitations and leg swelling  Gastrointestinal: Negative for abdominal pain, blood in stool, constipation, diarrhea, nausea and vomiting  Genitourinary: Negative for difficulty urinating, dysuria, flank pain, frequency and hematuria  Musculoskeletal: Positive for arthralgias (right shoulder)  Negative for back pain, gait problem, joint swelling and neck pain  Skin: Negative for rash  Neurological: Negative for dizziness, syncope, numbness and headaches  Hematological: Negative  Psychiatric/Behavioral: Negative for dysphoric mood and sleep disturbance  The patient is not nervous/anxious  Objective:      /84 (BP Location: Left arm, Patient Position: Sitting, Cuff Size: Standard)   Pulse 80   Temp 99 4 °F (37 4 °C) (Tympanic)   Resp 14   Ht 5' 6" (1 676 m)   Wt 85 7 kg (189 lb)   SpO2 96%   BMI 30 51 kg/m²          Physical Exam  Constitutional:       Appearance: Normal appearance  He is obese  HENT:      Head: Normocephalic and atraumatic  Right Ear: Tympanic membrane and external ear normal       Left Ear: Tympanic membrane and external ear normal    Eyes:      Conjunctiva/sclera: Conjunctivae normal       Pupils: Pupils are equal, round, and reactive to light  Neck:      Musculoskeletal: Normal range of motion and neck supple  Vascular: No carotid bruit  Cardiovascular:      Rate and Rhythm: Normal rate and regular rhythm  Heart sounds: No murmur     Pulmonary: Effort: Pulmonary effort is normal       Breath sounds: Normal breath sounds  Abdominal:      General: Bowel sounds are normal  There is no distension  Palpations: Abdomen is soft  Tenderness: There is no abdominal tenderness  Musculoskeletal:      Comments: Right shoulder: decreased ROM with abduction  No tenderness  Skin:     General: Skin is warm and dry  Findings: No rash  Neurological:      General: No focal deficit present  Mental Status: He is alert and oriented to person, place, and time  Cranial Nerves: No cranial nerve deficit  Motor: No weakness        Gait: Gait normal    Psychiatric:         Mood and Affect: Mood normal

## 2021-04-29 NOTE — ASSESSMENT & PLAN NOTE
Cardiopulmonary status is stable  Patient is medically stable to proceed with right shoulder arthroscopic surgery for rotator cuff repair,  subacromial decompression, debridement, biceps tenodesis scheduled on May 11, 2021 with Dr Pak  Patient had cardiac clearance done by cardiologist Dr Neda Frost yesterday  Recommended to stop Aspirin, NSAID's, Fish oil 1 week prior to surgery

## 2021-11-10 DIAGNOSIS — I10 ESSENTIAL HYPERTENSION: ICD-10-CM

## 2021-11-10 RX ORDER — LISINOPRIL 20 MG/1
TABLET ORAL
Qty: 90 TABLET | Refills: 3 | Status: SHIPPED | OUTPATIENT
Start: 2021-11-10

## 2022-03-02 ENCOUNTER — OFFICE VISIT (OUTPATIENT)
Dept: FAMILY MEDICINE CLINIC | Facility: CLINIC | Age: 67
End: 2022-03-02
Payer: COMMERCIAL

## 2022-03-02 VITALS
HEART RATE: 76 BPM | HEIGHT: 66 IN | DIASTOLIC BLOOD PRESSURE: 72 MMHG | SYSTOLIC BLOOD PRESSURE: 136 MMHG | WEIGHT: 189 LBS | BODY MASS INDEX: 30.37 KG/M2 | TEMPERATURE: 98.7 F

## 2022-03-02 DIAGNOSIS — Z12.5 SCREENING FOR PROSTATE CANCER: ICD-10-CM

## 2022-03-02 DIAGNOSIS — N39.8 VOIDING DYSFUNCTION: Primary | ICD-10-CM

## 2022-03-02 DIAGNOSIS — E78.2 MIXED HYPERLIPIDEMIA: ICD-10-CM

## 2022-03-02 DIAGNOSIS — I10 PRIMARY HYPERTENSION: ICD-10-CM

## 2022-03-02 PROCEDURE — 3725F SCREEN DEPRESSION PERFORMED: CPT | Performed by: FAMILY MEDICINE

## 2022-03-02 PROCEDURE — 99214 OFFICE O/P EST MOD 30 MIN: CPT | Performed by: FAMILY MEDICINE

## 2022-03-02 RX ORDER — TAMSULOSIN HYDROCHLORIDE 0.4 MG/1
0.4 CAPSULE ORAL
Qty: 30 CAPSULE | Refills: 5 | Status: SHIPPED | OUTPATIENT
Start: 2022-03-02 | End: 2022-06-06 | Stop reason: SDUPTHER

## 2022-03-02 NOTE — PROGRESS NOTES
Chief Complaint   Patient presents with    Follow-up     Health Maintenance   Topic Date Due    Hepatitis C Screening  Never done    COVID-19 Vaccine (1) Never done    Pneumococcal Vaccine: 65+ Years (1 of 2 - PPSV23) Never done    Annual Physical  Never done    Influenza Vaccine (1) Never done    Fall Risk  04/29/2022    BMI: Followup Plan  04/29/2022    Depression Screening  03/02/2023    BMI: Adult  03/02/2023    Colorectal Cancer Screening  07/30/2023    DTaP,Tdap,and Td Vaccines (3 - Td or Tdap) 07/09/2025    HIB Vaccine  Aged Out    Hepatitis B Vaccine  Aged Out    IPV Vaccine  Aged Out    Hepatitis A Vaccine  Aged Out    Meningococcal ACWY Vaccine  Aged Out    HPV Vaccine  Aged Out     BMI Counseling: Body mass index is 30 51 kg/m²  The BMI is above normal  Nutrition recommendations include decreasing portion sizes, encouraging healthy choices of fruits and vegetables, decreasing fast food intake, consuming healthier snacks, limiting drinks that contain sugar, moderation in carbohydrate intake, increasing intake of lean protein, reducing intake of saturated and trans fat and reducing intake of cholesterol  Exercise recommendations include exercising 3-5 times per week  No pharmacotherapy was ordered  Rationale for BMI follow-up plan is due to patient being overweight or obese  Depression Screening and Follow-up Plan: Patient was screened for depression during today's encounter  They screened negative with a PHQ-2 score of 0  Assessment/Plan:    Voiding dysfunction  Will check urinalysis microscopic  R/o BPH  Check PSA level  Start Tamsulosin 0 4 mg at bedtime  Encouraged to stay well hydrated  Recommended urology evaluation  Hypertension  BP is stable  Continue Lisinopril 20 mg daily  Hyperlipidemia  Continue Simvastatin 80 mg daily, Co Q10  Check CMP, lipid panel  Schedule physical exam 3 months       Diagnoses and all orders for this visit:    Voiding dysfunction  -     Comprehensive metabolic panel; Future  -     Ambulatory Referral to Urology; Future  -     Urinalysis with microscopic  -     tamsulosin (FLOMAX) 0 4 mg; Take 1 capsule (0 4 mg total) by mouth daily with dinner    Primary hypertension  -     Comprehensive metabolic panel; Future    Mixed hyperlipidemia  -     Lipid panel; Future    Screening for prostate cancer  -     PSA, Total Screen; Future          Subjective:      Patient ID: Kipp Dance is a 77 y o  male  HPI     Patient is 60-year-old male with PMhx of HTN, CAD, H/o MI in 2004, Hyperlipidemia presents to the office c/o difficulty urinating for over 6 months  He denies blood in urine  No burning sensation on urination  No H/o kidney stones  Family history is positive for prostate CA in his father  HTN - blood pressure remains stable  Patient takes lisinopril 20 mg daily  Denies chest pain, shortness of breath, dizziness  Hyperlipidemia -currently taking Simvastatin 80 mg daily, Co Q10  Quit smoking in 1990  The following portions of the patient's history were reviewed and updated as appropriate: allergies, current medications, past family history, past medical history, past surgical history and problem list     Review of Systems   Constitutional: Negative for activity change, appetite change, chills, fatigue and fever  Respiratory: Negative for cough, chest tightness, shortness of breath and wheezing  Cardiovascular: Negative for chest pain, palpitations and leg swelling  Gastrointestinal: Negative for abdominal pain, constipation, diarrhea, nausea and vomiting  Genitourinary: Positive for difficulty urinating  Negative for dysuria, flank pain and hematuria  Nocturia x 1   Musculoskeletal: Negative for back pain  Neurological: Negative for dizziness and headaches  Hematological: Negative            Objective:      /72   Pulse 76   Temp 98 7 °F (37 1 °C) (Tympanic)   Ht 5' 6" (1 676 m)   Wt 85 7 kg (189 lb)   BMI 30 51 kg/m²          Physical Exam  Vitals and nursing note reviewed  Constitutional:       Appearance: Normal appearance  He is obese  HENT:      Head: Normocephalic and atraumatic  Eyes:      Conjunctiva/sclera: Conjunctivae normal       Pupils: Pupils are equal, round, and reactive to light  Cardiovascular:      Rate and Rhythm: Normal rate and regular rhythm  Heart sounds: No murmur heard  Pulmonary:      Effort: Pulmonary effort is normal       Breath sounds: Normal breath sounds  Abdominal:      General: Bowel sounds are normal  There is no distension  Palpations: Abdomen is soft  Tenderness: There is no abdominal tenderness  Musculoskeletal:         General: No swelling or tenderness  Normal range of motion  Right lower leg: No edema  Left lower leg: No edema  Skin:     General: Skin is warm and dry  Neurological:      Mental Status: He is alert     Psychiatric:         Mood and Affect: Mood normal

## 2022-03-02 NOTE — ASSESSMENT & PLAN NOTE
Will check urinalysis microscopic  R/o BPH  Check PSA level  Start Tamsulosin 0 4 mg at bedtime  Encouraged to stay well hydrated  Recommended urology evaluation

## 2022-03-03 LAB
APPEARANCE UR: CLEAR
BACTERIA UR QL AUTO: NORMAL /HPF
BILIRUB UR QL STRIP: NEGATIVE
COLOR UR: NORMAL
GLUCOSE UR QL STRIP: NEGATIVE
HGB UR QL STRIP: NEGATIVE
HYALINE CASTS #/AREA URNS LPF: NORMAL /LPF
KETONES UR QL STRIP: NEGATIVE
LEUKOCYTE ESTERASE UR QL STRIP: NEGATIVE
NITRITE UR QL STRIP: NEGATIVE
PH UR STRIP: 6.5 [PH] (ref 5–8)
PROT UR QL STRIP: NEGATIVE
RBC #/AREA URNS HPF: NORMAL /HPF
SP GR UR STRIP: 1.02 (ref 1–1.03)
SQUAMOUS #/AREA URNS HPF: NORMAL /HPF
WBC #/AREA URNS HPF: NORMAL /HPF

## 2022-03-06 LAB
ALBUMIN SERPL-MCNC: 4 G/DL (ref 3.6–5.1)
ALBUMIN/GLOB SERPL: 1.5 (CALC) (ref 1–2.5)
ALP SERPL-CCNC: 98 U/L (ref 35–144)
ALT SERPL-CCNC: 17 U/L (ref 9–46)
AST SERPL-CCNC: 18 U/L (ref 10–35)
BILIRUB SERPL-MCNC: 0.6 MG/DL (ref 0.2–1.2)
BUN SERPL-MCNC: 16 MG/DL (ref 7–25)
BUN/CREAT SERPL: NORMAL (CALC) (ref 6–22)
CALCIUM SERPL-MCNC: 9 MG/DL (ref 8.6–10.3)
CHLORIDE SERPL-SCNC: 103 MMOL/L (ref 98–110)
CHOLEST SERPL-MCNC: 183 MG/DL
CHOLEST/HDLC SERPL: 3.7 (CALC)
CO2 SERPL-SCNC: 25 MMOL/L (ref 20–32)
CREAT SERPL-MCNC: 0.84 MG/DL (ref 0.7–1.25)
GLOBULIN SER CALC-MCNC: 2.6 G/DL (CALC) (ref 1.9–3.7)
GLUCOSE SERPL-MCNC: 94 MG/DL (ref 65–99)
HDLC SERPL-MCNC: 49 MG/DL
LDLC SERPL CALC-MCNC: 113 MG/DL (CALC)
NONHDLC SERPL-MCNC: 134 MG/DL (CALC)
POTASSIUM SERPL-SCNC: 4.6 MMOL/L (ref 3.5–5.3)
PROT SERPL-MCNC: 6.6 G/DL (ref 6.1–8.1)
PSA SERPL-MCNC: 1.53 NG/ML
SL AMB EGFR AFRICAN AMERICAN: 106 ML/MIN/1.73M2
SL AMB EGFR NON AFRICAN AMERICAN: 91 ML/MIN/1.73M2
SODIUM SERPL-SCNC: 137 MMOL/L (ref 135–146)
TRIGL SERPL-MCNC: 105 MG/DL

## 2022-03-18 ENCOUNTER — OFFICE VISIT (OUTPATIENT)
Dept: UROLOGY | Facility: CLINIC | Age: 67
End: 2022-03-18
Payer: COMMERCIAL

## 2022-03-18 VITALS
BODY MASS INDEX: 30.37 KG/M2 | HEART RATE: 85 BPM | DIASTOLIC BLOOD PRESSURE: 74 MMHG | SYSTOLIC BLOOD PRESSURE: 134 MMHG | HEIGHT: 66 IN | WEIGHT: 189 LBS

## 2022-03-18 DIAGNOSIS — R39.11 BENIGN PROSTATIC HYPERPLASIA WITH URINARY HESITANCY: Primary | ICD-10-CM

## 2022-03-18 DIAGNOSIS — N39.8 VOIDING DYSFUNCTION: ICD-10-CM

## 2022-03-18 DIAGNOSIS — N40.1 BENIGN PROSTATIC HYPERPLASIA WITH URINARY HESITANCY: Primary | ICD-10-CM

## 2022-03-18 PROCEDURE — 1036F TOBACCO NON-USER: CPT | Performed by: PHYSICIAN ASSISTANT

## 2022-03-18 PROCEDURE — 3008F BODY MASS INDEX DOCD: CPT | Performed by: PHYSICIAN ASSISTANT

## 2022-03-18 PROCEDURE — 99204 OFFICE O/P NEW MOD 45 MIN: CPT | Performed by: PHYSICIAN ASSISTANT

## 2022-03-18 PROCEDURE — 1160F RVW MEDS BY RX/DR IN RCRD: CPT | Performed by: PHYSICIAN ASSISTANT

## 2022-03-18 RX ORDER — TADALAFIL 5 MG/1
5 TABLET ORAL DAILY
Qty: 30 TABLET | Refills: 6 | Status: SHIPPED | OUTPATIENT
Start: 2022-03-18

## 2022-03-18 NOTE — PROGRESS NOTES
3/18/2022      Chief Complaint   Patient presents with    voiding dysfunction    Erectile Dysfunction         Assessment and Plan    77 y o  male managed by NEW PATIENT    1  BPH with weak stream  2  Erectile dysfunction  3  Prostate cancer screening    Lab Results   Component Value Date    PSA 1 53 03/05/2022     Patient recently started tamsulosin I recommend he continue this  Will also add 5 mg daily Cialis to his regimen to co treat his voiding symptoms and erectile dysfunction  Digital rectal examination performed today reveals a large smooth prostate without nodule asymmetry  PSA 1 5 as above  Recommend trying his medications as above, return to see me in three months for symptom reassessment with AUA symptom score and Uroflow/PVR  Briefly discussed surgical outlet evaluation and options down the road if no significant relief with medications  History of Present Illness  Joshua Diallo is a 77 y o  male here for evaluation of new patient referred for difficulty urinating gradual in onset over a few years most noticeable over the last six months  Primary complaint is weak stream, having to sit to urinate and strain to get started  Has split stream and intermittency  He feels once he gets going he empties well  Perhaps some mild frequency but good urge delay  He sleeps through the night and wakes to void between 5-6am  No incontinence or dribbling  No retention, no hematuria or UTIs  Also endorses decreased rigidity of erections over the last few years around 75% the stone successful penetration  His father had prostate cancer but does not know much else of history is he was not present in his life  Patient's PSA is 1 5 this month  Has been many years since last prostate examination  He was prescribed tamsulosin two weeks ago which he just started using  Has not noted any significant improvement just yet  He also takes Super beta prostate which he purchased over-the-counter      Today we discussed the normal anatomy of the bladder, prostate, bladder neck, and urethra, as well physiology as it relates to storage and emptying of the bladder utilizing drawn pictures or diagrams  We discussed the incidence of BPH as it relates to obstructive and irritative voiding symptoms as well as diagnosis, and treatment as below  We also discussed risks of delayed diagnosis and treatment including recurrent UTI, stone formation, hematuria, and renal dysfunction that may prompt specific type of treatment  Treatment options discussed include behavior modifications/avoidance of bladder irritants, medications such as Flomax, Proscar or Cialis, and surgery such as Urolift, TURP, prostatectomy  Alternative or concurrent diagnoses of OAB or SARAH were also reviewed requiring multimodal treatments  Review of Systems   Constitutional: Negative for activity change, appetite change, chills, fever and unexpected weight change  HENT: Negative  Respiratory: Negative  Negative for shortness of breath  Cardiovascular: Negative  Negative for chest pain  Gastrointestinal: Negative for abdominal pain, diarrhea, nausea and vomiting  Endocrine: Negative  Genitourinary: Positive for difficulty urinating (weak stream)  Negative for decreased urine volume, dysuria, flank pain, frequency, hematuria, penile pain, scrotal swelling, testicular pain and urgency  Musculoskeletal: Negative for back pain and gait problem  Skin: Negative  Allergic/Immunologic: Negative  Neurological: Negative  Hematological: Negative for adenopathy  Does not bruise/bleed easily  Urinary Incontinence Screening      Most Recent Value   Urinary Incontinence    Urinary Incontinence? No   Incomplete emptying? No   Urinary frequency? Yes   Urinary urgency? Yes   Urinary hesitancy? Yes  [sometimes]   Dysuria (painful difficult urination)? No   Nocturia (waking up to use the bathroom)? No   Straining (having to push to go)?  Yes Weak stream? Yes   Intermittent stream? Yes             Vitals  Vitals:    03/18/22 1524   BP: 134/74   BP Location: Left arm   Patient Position: Sitting   Cuff Size: Adult   Pulse: 85   Weight: 85 7 kg (189 lb)   Height: 5' 6" (1 676 m)       Physical Exam  Vitals and nursing note reviewed  Constitutional:       General: He is not in acute distress  Appearance: He is well-developed  He is not diaphoretic  HENT:      Head: Normocephalic and atraumatic  Pulmonary:      Effort: Pulmonary effort is normal    Genitourinary:     Comments: Circumcised penis, normal phallus, orthotopic patent meatus  Testes smooth descended bilaterally into the scrotum nontender with no palpable mass  Digital rectal exam smooth prostate 50g, without appreciable nodule, induration or asymmetry  Skin:     General: Skin is warm and dry  Neurological:      Mental Status: He is alert and oriented to person, place, and time        Gait: Gait normal    Psychiatric:         Speech: Speech normal          Behavior: Behavior normal            Past History  Past Medical History:   Diagnosis Date    Acute myocardial infarction ([facility] [address] )     CAD (coronary artery disease)     Coronary artery disease     Hyperlipidemia     Hypertension     Lumbar radiculopathy     Right L1-2 and L2-3 Last assessed: 2/4/13    Myocardial infarction [facility]) 2004    Sciatica     Last assessed: 11/5/12     Social History     Socioeconomic History    Marital status: /Civil Union     Spouse name: None    Number of children: None    Years of education: None    Highest education level: None   Occupational History    None   Tobacco Use    Smoking status: Former Smoker    Smokeless tobacco: Never Used   Vaping Use    Vaping Use: Never used   Substance and Sexual Activity    Alcohol use: Yes     Comment: Social    Drug use: No    Sexual activity: None   Other Topics Concern    None   Social History Narrative    None     Social Determinants of Health     Financial Resource Strain: Not on file   Food Insecurity: Not on file   Transportation Needs: Not on file   Physical Activity: Not on file   Stress: Not on file   Social Connections: Not on file   Intimate Partner Violence: Not on file   Housing Stability: Not on file     Social History     Tobacco Use   Smoking Status Former Smoker   Smokeless Tobacco Never Used     Family History   Problem Relation Age of Onset    Arthritis Mother     Dementia Father     Hypertension Father     Prostate cancer Father     Stroke Father         Syndrome    Coronary artery disease Maternal Uncle        The following portions of the patient's history were reviewed and updated as appropriate: allergies, current medications, past medical history, past social history, past surgical history and problem list     Results  No results found for this or any previous visit (from the past 1 hour(s)) ]  Lab Results   Component Value Date    PSA 1 53 03/05/2022     Lab Results   Component Value Date    CALCIUM 9 0 03/05/2022    K 4 6 03/05/2022    CO2 25 03/05/2022     03/05/2022    BUN 16 03/05/2022    CREATININE 0 84 03/05/2022     No results found for: WBC, HGB, HCT, MCV, PLT

## 2022-05-30 PROBLEM — N40.1 BENIGN PROSTATIC HYPERPLASIA WITH WEAK URINARY STREAM: Status: ACTIVE | Noted: 2022-05-30

## 2022-05-30 PROBLEM — R39.12 BENIGN PROSTATIC HYPERPLASIA WITH WEAK URINARY STREAM: Status: ACTIVE | Noted: 2022-05-30

## 2022-06-02 ENCOUNTER — OFFICE VISIT (OUTPATIENT)
Dept: FAMILY MEDICINE CLINIC | Facility: CLINIC | Age: 67
End: 2022-06-02
Payer: COMMERCIAL

## 2022-06-02 VITALS
HEART RATE: 72 BPM | DIASTOLIC BLOOD PRESSURE: 80 MMHG | HEIGHT: 66 IN | RESPIRATION RATE: 16 BRPM | BODY MASS INDEX: 29.25 KG/M2 | TEMPERATURE: 99.4 F | WEIGHT: 182 LBS | OXYGEN SATURATION: 97 % | SYSTOLIC BLOOD PRESSURE: 128 MMHG

## 2022-06-02 DIAGNOSIS — Z11.59 NEED FOR HEPATITIS C SCREENING TEST: ICD-10-CM

## 2022-06-02 DIAGNOSIS — E78.2 MIXED HYPERLIPIDEMIA: ICD-10-CM

## 2022-06-02 DIAGNOSIS — Z00.00 WELL ADULT EXAM: Primary | ICD-10-CM

## 2022-06-02 DIAGNOSIS — Z98.61 CAD S/P PERCUTANEOUS CORONARY ANGIOPLASTY: ICD-10-CM

## 2022-06-02 DIAGNOSIS — I25.10 CAD S/P PERCUTANEOUS CORONARY ANGIOPLASTY: ICD-10-CM

## 2022-06-02 DIAGNOSIS — N40.1 BENIGN PROSTATIC HYPERPLASIA WITH WEAK URINARY STREAM: ICD-10-CM

## 2022-06-02 DIAGNOSIS — K62.5 RECTAL BLEEDING: ICD-10-CM

## 2022-06-02 DIAGNOSIS — I10 PRIMARY HYPERTENSION: ICD-10-CM

## 2022-06-02 DIAGNOSIS — R39.12 BENIGN PROSTATIC HYPERPLASIA WITH WEAK URINARY STREAM: ICD-10-CM

## 2022-06-02 DIAGNOSIS — E66.9 OBESITY (BMI 30.0-34.9): ICD-10-CM

## 2022-06-02 PROCEDURE — 3725F SCREEN DEPRESSION PERFORMED: CPT | Performed by: FAMILY MEDICINE

## 2022-06-02 PROCEDURE — 99397 PER PM REEVAL EST PAT 65+ YR: CPT | Performed by: FAMILY MEDICINE

## 2022-06-02 NOTE — PROGRESS NOTES
Chief Complaint   Patient presents with    Follow-up     3 month       Physical Exam     Health Maintenance   Topic Date Due    Hepatitis C Screening  Never done    COVID-19 Vaccine (1) Never done    Pneumococcal Vaccine: 65+ Years (1 - PCV) Never done    Annual Physical  Never done    Fall Risk  04/29/2022    Influenza Vaccine (Season Ended) 09/01/2022    BMI: Followup Plan  03/02/2023    Depression Screening  06/02/2023    BMI: Adult  06/02/2023    Colorectal Cancer Screening  07/30/2023    DTaP,Tdap,and Td Vaccines (3 - Td or Tdap) 07/09/2025    HIB Vaccine  Aged Out    Hepatitis B Vaccine  Aged Out    IPV Vaccine  Aged Out    Hepatitis A Vaccine  Aged Out    Meningococcal ACWY Vaccine  Aged Out    HPV Vaccine  Aged [place] of Care: balance, strength, and gait training instructions were provided  Vitamin D supplementation was recommended  Assessment/Plan:    Hypertension  BP is well controlled  Continue Lisinopril 20 mg daily  CAD S/P percutaneous coronary angioplasty  Patient is asymptomatic  Continue aspirin, statin therapy  Follow-up with cardiology Dr Anastacio Vega  Hyperlipidemia  LDL goal <100  Continue Simvastatin 80 mg daily, Co Q10  Recommended follow a low-cholesterol diet, continue regular exercise  Recheck CMP, lipid panel in 4- 6 months  Obesity (BMI 30 0-34  9)  Patient change his diet, lost 7 lb since March 2022  Continue to work weight reduction  Benign prostatic hyperplasia with weak urinary stream  Patient stopped taking Cialis 5 mg daily as prescribed by urology in March this year due to feeling chest pain  Currently taking Flomax 0 4 mg at bedtime  Still c/o difficulty voiding  Recommended to continue Flomax and schedule follow-up visit with urology  Rectal bleeding  C/o rectal bleeding, possible rectal prolapse  Referred to colorectal surgeon for evaluation      Well adult exam  Recommended follow a well-balanced diet, regular exercise  Colonoscopy done in June 2020, multiple polyps were removed  Dr Steven Garcia recommended to repeat colonoscopy in 2023  Patient declined Pneumovax vaccination today  Will screen for Hep C       Schedule follow-up office visit in 6 months  Check labs prior to next visit  Diagnoses and all orders for this visit:    Well adult exam    Primary hypertension  -     Comprehensive metabolic panel; Future    CAD S/P percutaneous coronary angioplasty    Mixed hyperlipidemia  -     Comprehensive metabolic panel; Future  -     Lipid panel; Future    Obesity (BMI 30 0-34  9)    Benign prostatic hyperplasia with weak urinary stream    Rectal bleeding  -     Ambulatory Referral to Colorectal Surgery; Future    Need for hepatitis C screening test  -     Hepatitis C Antibody ([facility]); Future          Subjective:      Patient ID: Chikis Mims is a 77 y o  male  HPI     Patient presents for 3 month follow-up visit / physical exam       [place] to work  Patient states that he walks 10,000 steps per day  Changed his diet, lost 7 lb since last visit in March 2022  PMHx: HTN, Hyperlipidemia, CAD, H/o MI in 2004, cardiac cath with stent placement, mild obesity, BPH  Reviewed current medications, last blood test results from March 2022  PSA 1 53  Cholesterol 183, HDL 49, , fasting blood sugar 94, creatinine 0 84  Potassium 4 6  HTN /CAD- denies chest pain, shortness of breath, dizziness  Patient takes Lisinopril 20 mg daily, Simvastatin 80 mg daily, aspirin 81 mg daily, Co Q10  He had negative exercise stress test in June 2020, was previously followed by cardiologist Dr Javier Burton  Quit smoking in 1990  BPH -patient takes Flomax 0 4 mg at bedtime  Reports no nocturia  C/o difficulty urinating, was seen by urology PA-C in March  Cialis 5 mg daily was added to his medical regimen     Patient states that he stopped taking Cialis due to feeling chest pain       Family history is positive for prostate CA in his father  Patient had colonoscopy done by Dr Gracie Witt in June 2020, multiple polyps were removed  Dr Gracie Witt recommended to repeat colonoscopy in 3 years  Patient c/o rectal bleeding, possible rectal prolapse  Reports no diarrhea or constipation  Refusing Pneumococcal vaccination  The following portions of the patient's history were reviewed and updated as appropriate: allergies, current medications, past medical history, past social history, past surgical history and problem list     Review of Systems   Constitutional: Negative for activity change, appetite change, chills, fatigue and fever  HENT: Positive for hearing loss (mild)  Negative for congestion, ear pain, nosebleeds, sore throat and trouble swallowing  Eyes: Negative  Respiratory: Negative for cough, chest tightness, shortness of breath and wheezing  Cardiovascular: Negative for chest pain, palpitations and leg swelling  Gastrointestinal: Positive for anal bleeding  Negative for abdominal pain, constipation, diarrhea, nausea and vomiting  Genitourinary: Positive for difficulty urinating  Negative for dysuria, flank pain and hematuria  Musculoskeletal: Negative for arthralgias, back pain, gait problem and joint swelling  Skin: Negative for rash  Neurological: Negative for dizziness, syncope and headaches  Hematological: Negative  Psychiatric/Behavioral: Negative for dysphoric mood and sleep disturbance  The patient is not nervous/anxious  Objective:      /80 (BP Location: Left arm, Patient Position: Sitting, Cuff Size: Standard)   Pulse 72   Temp 99 4 °F (37 4 °C) (Tympanic)   Resp 16   Ht 5' 6" (1 676 m)   Wt 82 6 kg (182 lb)   SpO2 97%   BMI 29 38 kg/m²          Physical Exam  Vitals and nursing note reviewed  Constitutional:       Appearance: Normal appearance  HENT:      Head: Normocephalic and atraumatic     Eyes: Conjunctiva/sclera: Conjunctivae normal       Pupils: Pupils are equal, round, and reactive to light  Neck:      Vascular: No carotid bruit  Cardiovascular:      Rate and Rhythm: Normal rate and regular rhythm  Heart sounds: No murmur heard  Pulmonary:      Effort: Pulmonary effort is normal       Breath sounds: Normal breath sounds  Abdominal:      General: Bowel sounds are normal  There is no distension  Palpations: Abdomen is soft  Tenderness: There is no abdominal tenderness  Musculoskeletal:         General: No swelling, tenderness or deformity  Normal range of motion  Cervical back: Normal range of motion and neck supple  Right lower leg: No edema  Left lower leg: No edema  Skin:     General: Skin is warm and dry  Findings: No rash  Neurological:      General: No focal deficit present  Mental Status: He is alert  Motor: No weakness        Gait: Gait normal    Psychiatric:         Mood and Affect: Mood normal

## 2022-06-02 NOTE — ASSESSMENT & PLAN NOTE
Patient stopped taking Cialis 5 mg daily as prescribed by urology in March this year due to feeling chest pain  Currently taking Flomax 0 4 mg at bedtime  Still c/o difficulty voiding  Recommended to continue Flomax and schedule follow-up visit with urology

## 2022-06-02 NOTE — ASSESSMENT & PLAN NOTE
LDL goal <100  Continue Simvastatin 80 mg daily, Co Q10  Recommended follow a low-cholesterol diet, continue regular exercise  Recheck CMP, lipid panel in 4- 6 months

## 2022-06-02 NOTE — ASSESSMENT & PLAN NOTE
Recommended follow a well-balanced diet, regular exercise  Colonoscopy done in June 2020, multiple polyps were removed  Dr Gildardo Adams recommended to repeat colonoscopy in 2023  Patient declined Pneumovax vaccination today  Will screen for Hep C

## 2022-06-02 NOTE — ASSESSMENT & PLAN NOTE
Patient is asymptomatic  Continue aspirin, statin therapy  Follow-up with cardiology Dr Rafa Orozco

## 2022-06-03 ENCOUNTER — OFFICE VISIT (OUTPATIENT)
Dept: UROLOGY | Facility: CLINIC | Age: 67
End: 2022-06-03
Payer: COMMERCIAL

## 2022-06-03 VITALS
SYSTOLIC BLOOD PRESSURE: 120 MMHG | HEIGHT: 66 IN | DIASTOLIC BLOOD PRESSURE: 72 MMHG | WEIGHT: 187 LBS | BODY MASS INDEX: 30.05 KG/M2

## 2022-06-03 DIAGNOSIS — N40.1 BENIGN PROSTATIC HYPERPLASIA WITH WEAK URINARY STREAM: Primary | ICD-10-CM

## 2022-06-03 DIAGNOSIS — R39.12 BENIGN PROSTATIC HYPERPLASIA WITH WEAK URINARY STREAM: Primary | ICD-10-CM

## 2022-06-03 PROCEDURE — 1036F TOBACCO NON-USER: CPT | Performed by: PHYSICIAN ASSISTANT

## 2022-06-03 PROCEDURE — 1160F RVW MEDS BY RX/DR IN RCRD: CPT | Performed by: PHYSICIAN ASSISTANT

## 2022-06-03 PROCEDURE — 99214 OFFICE O/P EST MOD 30 MIN: CPT | Performed by: PHYSICIAN ASSISTANT

## 2022-06-03 PROCEDURE — 3008F BODY MASS INDEX DOCD: CPT | Performed by: PHYSICIAN ASSISTANT

## 2022-06-03 NOTE — PROGRESS NOTES
6/3/2022      Chief Complaint   Patient presents with    Follow-up         Assessment and Plan    77 y o  male managed by AP team    1  BPH with obstructive symptoms  2  Erectile dysfunction  3  Prostate cancer screening    He is taking flomax now 4 months and still bothered with weak stream, no significant irritative symptoms  Did not tolerate cialis well  Concerned for further erectile changes with finasteride  Last visit I had detailed the a BPH algorithm/treatment/workup, at this time I have recommended cystoscopy with transrectal ultrasound measurements for surgical outlet evaluation given persistent symptoms despite medical therapy and patient is interested in procedural treatments, good candidate for same  Did not void in office today  Uroflow  PVR  Cystoscopy  TRUS  PSA 1 35  EFE smooth prostate no nodule (march 2022)  Rx- flomax, cialis    History of Present Illness  Franklin Alan is a 77 y o  male here for evaluation of three-month follow-up BPH with weak stream, erectile dysfunction or prostate cancer screening  Primary complaint is weak and hesitant urinary stream having this sit to get started  He is straining to urinate while sitting to the point he feels he may have developed hemorrhoid or anal prolapse  Has upcoming colorectal evaluation, colonoscopy two years ago  Mild erectile dysfunction, able to achieve 75% rigidity  At last visit was prescribed 5 mg Cialis daily to be added to his prescription of tamsulosin  He stopped prescription of Cialis as he felt that triggered some chest pain and heartburn if taken everyday, has used PRN dosing with no side effect and good erection  Review of Systems   Constitutional: Negative for activity change, appetite change, chills, fever and unexpected weight change  HENT: Negative  Respiratory: Negative  Negative for shortness of breath  Cardiovascular: Negative  Negative for chest pain     Gastrointestinal: Negative for abdominal pain, diarrhea, nausea and vomiting  Endocrine: Negative  Genitourinary: Positive for difficulty urinating  Negative for decreased urine volume, dysuria, flank pain, frequency, hematuria, scrotal swelling, testicular pain and urgency  Musculoskeletal: Negative for back pain and gait problem  Skin: Negative  Allergic/Immunologic: Negative  Neurological: Negative  Hematological: Negative for adenopathy  Does not bruise/bleed easily  Vitals  Vitals:    06/03/22 1455   BP: 120/72   Weight: 84 8 kg (187 lb)   Height: 5' 6" (1 676 m)       Physical Exam  Vitals and nursing note reviewed  Constitutional:       General: He is not in acute distress  Appearance: He is well-developed  He is not diaphoretic  HENT:      Head: Normocephalic and atraumatic  Pulmonary:      Effort: Pulmonary effort is normal    Skin:     General: Skin is warm and dry  Neurological:      Mental Status: He is alert and oriented to person, place, and time        Gait: Gait normal    Psychiatric:         Speech: Speech normal          Behavior: Behavior normal            Past History  Past Medical History:   Diagnosis Date    Acute myocardial infarction ([facility] [address] )     CAD (coronary artery disease)     Coronary artery disease     Hyperlipidemia     Hypertension     Lumbar radiculopathy     Right L1-2 and L2-3 Last assessed: 2/4/13    Myocardial infarction [facility]) 2004    Sciatica     Last assessed: 11/5/12     Social History     Socioeconomic History    Marital status: /Civil Union     Spouse name: None    Number of children: None    Years of education: None    Highest education level: None   Occupational History    None   Tobacco Use    Smoking status: Former Smoker    Smokeless tobacco: Never Used   Vaping Use    Vaping Use: Never used   Substance and Sexual Activity    Alcohol use: Yes     Comment: Social    Drug use: No    Sexual activity: None   Other Topics Concern    None   Social History Narrative    None     Social Determinants of Health     Financial Resource Strain: Not on file   Food Insecurity: Not on file   Transportation Needs: Not on file   Physical Activity: Not on file   Stress: Not on file   Social Connections: Not on file   Intimate Partner Violence: Not on file   Housing Stability: Not on file     Social History     Tobacco Use   Smoking Status Former Smoker   Smokeless Tobacco Never Used     Family History   Problem Relation Age of Onset    Arthritis Mother     Dementia Father     Hypertension Father     Prostate cancer Father     Stroke Father         Syndrome    Coronary artery disease Maternal Uncle        The following portions of the patient's history were reviewed and updated as appropriate: allergies, current medications, past medical history, past social history, past surgical history and problem list     Results  No results found for this or any previous visit (from the past 1 hour(s)) ]  Lab Results   Component Value Date    PSA 1 53 03/05/2022     Lab Results   Component Value Date    CALCIUM 9 0 03/05/2022    K 4 6 03/05/2022    CO2 25 03/05/2022     03/05/2022    BUN 16 03/05/2022    CREATININE 0 84 03/05/2022     No results found for: WBC, HGB, HCT, MCV, PLT

## 2022-06-06 DIAGNOSIS — N39.8 VOIDING DYSFUNCTION: ICD-10-CM

## 2022-06-06 RX ORDER — TAMSULOSIN HYDROCHLORIDE 0.4 MG/1
0.4 CAPSULE ORAL
Qty: 30 CAPSULE | Refills: 5 | Status: SHIPPED | OUTPATIENT
Start: 2022-06-06

## 2022-07-05 ENCOUNTER — PROCEDURE VISIT (OUTPATIENT)
Dept: UROLOGY | Facility: CLINIC | Age: 67
End: 2022-07-05
Payer: COMMERCIAL

## 2022-07-05 VITALS
WEIGHT: 186 LBS | BODY MASS INDEX: 29.89 KG/M2 | SYSTOLIC BLOOD PRESSURE: 132 MMHG | DIASTOLIC BLOOD PRESSURE: 72 MMHG | HEART RATE: 68 BPM | HEIGHT: 66 IN

## 2022-07-05 DIAGNOSIS — R39.12 BENIGN PROSTATIC HYPERPLASIA WITH WEAK URINARY STREAM: Primary | ICD-10-CM

## 2022-07-05 DIAGNOSIS — N40.1 BENIGN PROSTATIC HYPERPLASIA WITH WEAK URINARY STREAM: Primary | ICD-10-CM

## 2022-07-05 LAB
SL AMB  POCT GLUCOSE, UA: NORMAL
SL AMB LEUKOCYTE ESTERASE,UA: NORMAL
SL AMB POCT BILIRUBIN,UA: NORMAL
SL AMB POCT BLOOD,UA: NORMAL
SL AMB POCT CLARITY,UA: CLEAR
SL AMB POCT COLOR,UA: YELLOW
SL AMB POCT KETONES,UA: NORMAL
SL AMB POCT NITRITE,UA: NORMAL
SL AMB POCT PH,UA: 7.5
SL AMB POCT SPECIFIC GRAVITY,UA: 1.02
SL AMB POCT URINE PROTEIN: NORMAL
SL AMB POCT UROBILINOGEN: 0.2

## 2022-07-05 PROCEDURE — 99214 OFFICE O/P EST MOD 30 MIN: CPT | Performed by: UROLOGY

## 2022-07-05 PROCEDURE — 1160F RVW MEDS BY RX/DR IN RCRD: CPT | Performed by: UROLOGY

## 2022-07-05 PROCEDURE — 81002 URINALYSIS NONAUTO W/O SCOPE: CPT | Performed by: UROLOGY

## 2022-07-05 PROCEDURE — 76872 US TRANSRECTAL: CPT | Performed by: UROLOGY

## 2022-07-05 PROCEDURE — 51798 US URINE CAPACITY MEASURE: CPT | Performed by: UROLOGY

## 2022-07-05 PROCEDURE — 52000 CYSTOURETHROSCOPY: CPT | Performed by: UROLOGY

## 2022-07-05 PROCEDURE — 51741 ELECTRO-UROFLOWMETRY FIRST: CPT | Performed by: UROLOGY

## 2022-07-05 NOTE — PROGRESS NOTES
UROLOGY PROCEDURE NOTE     CHIEF COMPLAINT   Meaghan Chao is a 77 y o  male with a complaint of   Chief Complaint   Patient presents with    Cystoscopy    Benign Prostatic Hypertrophy       History of Present Illness:     77 y o  male with lower urinary tract symptoms  Seen by the advanced practitioner team   Patient has a weak and hesitant urinary stream sometimes having to sit to start  He often has some issues with straining  Is following with colorectal surgery given hemorrhoids and blood in his stool, visit upcoming on the 15th of this month  Patient currently managed on Flomax single-dose a dinner and has been offered Cialis 5 mg daily which he has not taking at current  Patient reports that if he takes the Cialis 5 mg daily, he has some occasional chest pain  If he takes it intermittently, he has no side effects      Lab Results   Component Value Date    PSA 1 53 03/05/2022       Past Medical History:     Past Medical History:   Diagnosis Date    Acute myocardial infarction [facility])     CAD (coronary artery disease)     Coronary artery disease     Hyperlipidemia     Hypertension     Lumbar radiculopathy     Right L1-2 and L2-3 Last assessed: 2/4/13    Myocardial infarction [facility]) 2004    Sciatica     Last assessed: 11/5/12       PAST SURGICAL HISTORY:     Past Surgical History:   Procedure Laterality Date    APPENDECTOMY      Resolved: 1974    COLONOSCOPY      CORONARY ANGIOPLASTY WITH STENT PLACEMENT      Resolved: 2004    SEPTOPLASTY      Resolved: 1994       CURRENT MEDICATIONS:     Current Outpatient Medications   Medication Sig Dispense Refill    aspirin 81 MG tablet Take by mouth      CO-ENZYME Q10 PO Take by mouth      fish oil-omega-3 fatty acids 1000 MG capsule Take 2 g by mouth daily      lisinopril (ZESTRIL) 20 mg tablet TAKE ONE TABLET BY MOUTH EVERY DAY 90 tablet 3    Multiple Vitamins-Minerals (MULTIVITAL-M) TABS Take 1 tablet by mouth daily      nitroglycerin (NITROSTAT) 0 4 mg SL tablet Place 1 tablet under the tongue every 5 (five) minutes as needed      simvastatin (ZOCOR) 80 mg tablet TAKE ONE TABLET BY MOUTH EVERY DAY 90 tablet 0    tamsulosin (FLOMAX) 0 4 mg Take 1 capsule (0 4 mg total) by mouth daily with dinner 30 capsule 5    tadalafil (CIALIS) 5 MG tablet Take 1 tablet (5 mg total) by mouth in the morning (Patient not taking: No sig reported) 30 tablet 6     No current facility-administered medications for this visit  ALLERGIES:     Allergies   Allergen Reactions    Daypro [Oxaprozin] Itching       SOCIAL HISTORY:     Social History     Socioeconomic History    Marital status: /Civil Union     Spouse name: None    Number of children: None    Years of education: None    Highest education level: None   Occupational History    None   Tobacco Use    Smoking status: Former Smoker    Smokeless tobacco: Never Used   Vaping Use    Vaping Use: Never used   Substance and Sexual Activity    Alcohol use: Yes     Comment: Social    Drug use: No    Sexual activity: None   Other Topics Concern    None   Social History Narrative    None     Social Determinants of Health     Financial Resource Strain: Not on file   Food Insecurity: Not on file   Transportation Needs: Not on file   Physical Activity: Not on file   Stress: Not on file   Social Connections: Not on file   Intimate Partner Violence: Not on file   Housing Stability: Not on file       SOCIAL HISTORY:     Family History   Problem Relation Age of Onset    Arthritis Mother     Dementia Father     Hypertension Father     Prostate cancer Father     Stroke Father         Syndrome    Coronary artery disease Maternal Uncle        REVIEW OF SYSTEMS:     Review of Systems   Constitutional: Negative  Respiratory: Negative  Cardiovascular: Negative  Gastrointestinal: Positive for blood in stool  Negative for constipation  Genitourinary: Positive for difficulty urinating     Musculoskeletal: Negative  Skin: Negative  Psychiatric/Behavioral: Negative  PHYSICAL EXAM:     Ht 5' 6" (1 676 m)   Wt 84 4 kg (186 lb)   BMI 30 02 kg/m²     General:  Healthy appearing male in no acute distress  They have a normal affect  There is not appear to be any gross neurologic defects or abnormalities  HEENT:  Normocephalic, atraumatic  Neck is supple without any palpable lymphadenopathy  Cardiovascular:  Patient has normal palpable distal radial pulses  There is no significant peripheral edema  No JVD is noted  Respiratory:  Patient has unlabored respirations  There is no audible wheeze or rhonchi  Abdomen:  Abdomen is without surgical scars  Abdomen is soft and nontender  There is no tympany  Inguinal and umbilical hernia are not appreciated  Genitourinary: no penile lesions or discharge, no testicular masses or tenderness, no hernias, mild external hemorrhoid noted, non congested, prostate smooth    Musculoskeletal:  Patient does not have significant CVA tenderness in the  flank with palpation or percussion  They full range of motion in all 4 extremities  Strength in all 4 extremities appears congruent  Patient is able to ambulate without assistance or difficulty  Dermatologic:  Patient has no skin abnormalities or rashes  LABS:     CBC: No results found for: WBC, HGB, HCT, MCV, PLT    BMP:   Lab Results   Component Value Date    CALCIUM 9 0 03/05/2022    K 4 6 03/05/2022    CO2 25 03/05/2022     03/05/2022    BUN 16 03/05/2022    CREATININE 0 84 03/05/2022       IMAGING:     No recent urologic imaging    PROCEDURE:     See note    ASSESSMENT:     77 y o  male with lower urinary tract symptoms    PLAN:     The patient has signs of bladder outlet obstruction in the prostatic urethra with a large obstructing median lobe  This was well visualized on retroflexion and shown to the patient    Was also visualized on the trans rectal ultrasound which demonstrates an enlarged size of 85 g   Options for the management of his prostate were discussed and reviewed including escalation of medication based therapy  Given the patient's current sexual dysfunction, Proscar would certainly add to his likely erection issues and libido  We discussed that Transurethral resection or holmium laser enucleation our options for his prostate within our network  We discussed aqua ablation being a surgical option that would require referral   Each of these surgical options was discussed and reviewed  Patient would need cardiac clearance prior to any consideration of intervention  At this point, the patient continues to prefer to stay on Flomax  He does not want to add Proscar long-term given the sexual side effects  We counseled him about judicious use of the Cialis if he is having any cardiac symptoms  Patient was described all available surgical options  He would like to think about these options and will let me know how he wishes to proceed  He understands that if he is interested in transurethral resection or even holmium laser, may recommend 30 days of preoperative finasteride to reduce bleeding concerns  Follow-up evaluation in 3 months with advanced practitioner unless the patient has alternative preference in the interim

## 2022-07-05 NOTE — PROGRESS NOTES
Cystoscopy     Date/Time 7/5/2022 9:16 AM     Performed by  Benson Block MD     Authorized by Benson Block MD      Universal Protocol:  Timeout called at: 7/5/2022 9:16 AM       Procedure Details:  Procedure type: cystoscopy    Patient tolerance: Patient tolerated the procedure well with no immediate complications    Additional Procedure Details:   Cystoscopy procedure note:    Risk and benefits of flexible cystoscopy were discussed  Informed consent was obtained  Urine dip was adequate for cystoscopy  The patient was placed in the supine position  His genitalia was prepped with Betadine and draped in a sterile fashion  Viscous 2% lidocaine jelly was instilled into the urethra and allowed dwell time for local anesthesia  Flexible cystoscopy was then performed using a 16F scope  The distal urethra and prostatic urethra were evaluated and were normal in course and caliber  Patient did have bladder outlet obstruction with coaptation of the prostatic urethra  There did appear to be obstruction at the bladder neck from median lobe  Once inside the bladder, it was carefully inspected in a 360 degree fashion  There was no evidence of mucosal abnormalities, lesions, diverticula or stones  Both ureteral orifices in their orthotopic location were visualized with clear efflux of urine  Retroflexion for evaluation of the bladder neck demonstrated a large pedunculated median lobe with clear occlusion  Overall this was a negative cystoscopy  Patient was filled with 300 cc of fluid  The left lateral decubitus position  Transrectal ultrasound probe introduced  Prostate measured in 3 dimensions  End volume 84 cc  Patient was then given the opportunity to void in the uroflow commode        Uroflow    Date/Time: 7/5/2022 10:05 AM  Performed by: Benson Block MD  Authorized by: Benson Block MD   Universal Protocol:  Timeout called at: 7/5/2022 10:05 AM   Procedure - Urodynamics:  Procedure details: Simple Uroflow          Post-procedure:     Patient tolerance: Patient tolerated procedure well with no immediate complications      Comments:      Patient was able to void a volume of 189 cc with a max flow rate of 3 cc/second and an average flow rate of 2 8 cc/second  Patient was noted to have restricted flow    Postvoid residual was 220 cc

## 2022-07-05 NOTE — PATIENT INSTRUCTIONS
Alternative to transurethral resection of the prostate (TURP) would be holmium laser enucleation of the prostate (HoLEP) which could be offered within our system by my partner Dr Grady Horta-ablation is computer-generated ablation of prostate tissue  This is not offered at [address] [place] [address] currently and would require referral to [place]

## 2022-07-22 PROBLEM — K64.8 PROLAPSED HEMORRHOIDS: Status: ACTIVE | Noted: 2022-07-22

## 2022-10-07 ENCOUNTER — OFFICE VISIT (OUTPATIENT)
Dept: UROLOGY | Facility: CLINIC | Age: 67
End: 2022-10-07
Payer: COMMERCIAL

## 2022-10-07 VITALS
SYSTOLIC BLOOD PRESSURE: 114 MMHG | DIASTOLIC BLOOD PRESSURE: 64 MMHG | WEIGHT: 173.8 LBS | HEIGHT: 66 IN | OXYGEN SATURATION: 95 % | HEART RATE: 58 BPM | BODY MASS INDEX: 27.93 KG/M2

## 2022-10-07 DIAGNOSIS — N39.8 VOIDING DYSFUNCTION: ICD-10-CM

## 2022-10-07 DIAGNOSIS — R39.12 BENIGN PROSTATIC HYPERPLASIA WITH WEAK URINARY STREAM: Primary | ICD-10-CM

## 2022-10-07 DIAGNOSIS — N40.1 BENIGN PROSTATIC HYPERPLASIA WITH WEAK URINARY STREAM: Primary | ICD-10-CM

## 2022-10-07 LAB
POST-VOID RESIDUAL VOLUME, ML POC: 126 ML
SL AMB  POCT GLUCOSE, UA: ABNORMAL
SL AMB LEUKOCYTE ESTERASE,UA: ABNORMAL
SL AMB POCT BILIRUBIN,UA: ABNORMAL
SL AMB POCT BLOOD,UA: ABNORMAL
SL AMB POCT CLARITY,UA: ABNORMAL
SL AMB POCT COLOR,UA: YELLOW
SL AMB POCT KETONES,UA: ABNORMAL
SL AMB POCT NITRITE,UA: POSITIVE
SL AMB POCT PH,UA: 7
SL AMB POCT SPECIFIC GRAVITY,UA: 1.01
SL AMB POCT URINE PROTEIN: ABNORMAL
SL AMB POCT UROBILINOGEN: 0.2

## 2022-10-07 PROCEDURE — 81002 URINALYSIS NONAUTO W/O SCOPE: CPT | Performed by: PHYSICIAN ASSISTANT

## 2022-10-07 PROCEDURE — 99214 OFFICE O/P EST MOD 30 MIN: CPT | Performed by: PHYSICIAN ASSISTANT

## 2022-10-07 PROCEDURE — 51798 US URINE CAPACITY MEASURE: CPT | Performed by: PHYSICIAN ASSISTANT

## 2022-10-07 PROCEDURE — 87186 SC STD MICRODIL/AGAR DIL: CPT | Performed by: PHYSICIAN ASSISTANT

## 2022-10-07 PROCEDURE — 87086 URINE CULTURE/COLONY COUNT: CPT | Performed by: PHYSICIAN ASSISTANT

## 2022-10-07 RX ORDER — TAMSULOSIN HYDROCHLORIDE 0.4 MG/1
0.4 CAPSULE ORAL
Qty: 30 CAPSULE | Refills: 5 | Status: CANCELLED | OUTPATIENT
Start: 2022-10-07

## 2022-10-07 NOTE — PROGRESS NOTES
10/7/2022      Chief Complaint   Patient presents with    Follow-up     3 month follow up for weak urinary symptoms  Patient states his symptoms are getting worse    Benign Prostatic Hypertrophy     With weak stream         Assessment and Plan    79 y o  male managed by Dr Haylee Garcia    1  BPH with obstructive lower urinary tract symptoms  2  Erectile dysfunction    Dorothea Lockhart is doing okay, no better no worse now six months on the tamsulosin  Known large gland with obstructing median lobe  He has primarily obstructive symptoms with an AUA symptom score of 24, quality of life 5  He is interested in pursuing more aggressive surgical options  As previously discussed I reviewed the indications, perioperative procedure, and benefits of TURP, HoLEP, referral for aqua ablation  He would like to schedule TURP with Dr Haylee Garcia  I will plan 30 of finasteride prior to surgery for reduced prostatic bleeding once a date has been established  His urine specimen does show some leukocytes and nitrites today  He has never had urinary tract infection  On further questioning he does endorse about three weeks of increased urgency that he did not have previously  No fevers or dysuria, no hematuria  PVR today 126ml  Will follow the culture and send abx if indicated early next week pending results  He can continue to use "on demand" dosing cialis 5mg for erections but not daily dosing as he had some gi/cardiac symptoms with that dose  He agrees with the plan outlined above  History of Present Illness  Cindy Abebe is a 79 y o  male here for evaluation of three-month follow-up BPH with weak stream, on Flomax for about six months now  Has some chest discomfort when utilizing daily Cialis but no side effects when used on demand for erections  Underwent cystoscopy and TRUS measurement in July demonstrates large obstructing median lobe, and overall large gland 85 g   After discussion of the surgical options at the time (TURP, Holep, aquablation referral) he preferred to continue with medical management presents today for symptom reassessment  Primarily obstructive symptoms as before  Prostate cancer screening up-to-date with PSA 1 5 earlier this year and benign digital rectal exam at last visit will continue yearly screening  Uroflow (189cc, max rate 3cc/s, avg rate 2 8cc/s)  PVR 220ml  Cystoscopy July 2022 obstruction, median lobe  trus 85g  psa 1 53  EFE march 2022  rx- flomax 0 4mg qhs    AUA SYMPTOM SCORE    Flowsheet Row Most Recent Value   AUA SYMPTOM SCORE    How often have you had a sensation of not emptying your bladder completely after you finished urinating? 3   How often have you had to urinate again less than two hours after you finished urinating? 4   How often have you found you stopped and started again several times when you urinate? 5   How often have you found it difficult to postpone urination? 2   How often have you had a weak urinary stream? 5   How often have you had to push or strain to begin urination? 5   How many times did you most typically get up to urinate from the time you went to bed at night until the time you got up in the morning? 0   Quality of Life: If you were to spend the rest of your life with your urinary condition just the way it is now, how would you feel about that? 5   AUA SYMPTOM SCORE 24            Review of Systems   Constitutional: Negative for activity change, appetite change, chills, fever and unexpected weight change  HENT: Negative  Respiratory: Negative  Negative for shortness of breath  Cardiovascular: Negative  Negative for chest pain  Gastrointestinal: Negative for abdominal pain, diarrhea, nausea and vomiting  Endocrine: Negative  Genitourinary: Positive for difficulty urinating and urgency  Negative for decreased urine volume, dysuria, flank pain, frequency, hematuria and testicular pain  Musculoskeletal: Negative for back pain and gait problem     Skin: Negative  Allergic/Immunologic: Negative  Neurological: Negative  Hematological: Negative for adenopathy  Does not bruise/bleed easily  Vitals  Vitals:    10/07/22 1311   BP: 114/64   Pulse: 58   SpO2: 95%   Weight: 78 8 kg (173 lb 12 8 oz)   Height: 5' 6" (1 676 m)       Physical Exam  Vitals and nursing note reviewed  Constitutional:       General: He is not in acute distress  Appearance: Normal appearance  He is well-developed  He is not diaphoretic  HENT:      Head: Normocephalic and atraumatic  Pulmonary:      Effort: Pulmonary effort is normal       Comments: No cough or audible wheeze  Abdominal:      General: There is no distension  Tenderness: There is no abdominal tenderness  There is no right CVA tenderness or left CVA tenderness  Musculoskeletal:      Right lower leg: No edema  Left lower leg: No edema  Skin:     General: Skin is warm and dry  Neurological:      Mental Status: He is alert and oriented to person, place, and time        Gait: Gait normal    Psychiatric:         Speech: Speech normal          Behavior: Behavior normal            Past History  Past Medical History:   Diagnosis Date    Acute myocardial infarction ([facility] [address] )     CAD (coronary artery disease)     Coronary artery disease     Hyperlipidemia     Hypertension     Lumbar radiculopathy     Right L1-2 and L2-3 Last assessed: 13    Myocardial infarction [facility])     Sciatica     Last assessed: 12     Social History     Socioeconomic History    Marital status: /Civil Union     Spouse name: None    Number of children: None    Years of education: None    Highest education level: None   Occupational History    None   Tobacco Use    Smoking status: Former Smoker     Quit date:      Years since quittin 7    Smokeless tobacco: Never Used   Vaping Use    Vaping Use: Never used   Substance and Sexual Activity    Alcohol use: Yes     Comment: Social    Drug use: No    Sexual activity: None   Other Topics Concern    None   Social History Narrative    None     Social Determinants of Health     Financial Resource Strain: Not on file   Food Insecurity: Not on file   Transportation Needs: Not on file   Physical Activity: Not on file   Stress: Not on file   Social Connections: Not on file   Intimate Partner Violence: Not on file   Housing Stability: Not on file     Social History     Tobacco Use   Smoking Status Former Smoker    Quit date:     Years since quittin 7   Smokeless Tobacco Never Used     Family History   Problem Relation Age of Onset    Dementia Father     Hypertension Father     Prostate cancer Father     Stroke Father         Syndrome    Arthritis Mother     Coronary artery disease Maternal Uncle        The following portions of the patient's history were reviewed and updated as appropriate: allergies, current medications, past medical history, past social history, past surgical history and problem list     Results  Recent Results (from the past 1 hour(s))   POCT Measure PVR    Collection Time: 10/07/22  1:22 PM   Result Value Ref Range    POST-VOID RESIDUAL VOLUME, ML  mL   POCT urine dip    Collection Time: 10/07/22  1:22 PM   Result Value Ref Range    LEUKOCYTE ESTERASE,UA large     NITRITE,UA positive     SL AMB POCT UROBILINOGEN 0 2     POCT URINE PROTEIN neg      PH,UA 7 0     BLOOD,UA trace     SPECIFIC GRAVITY,UA 1 015     KETONES,UA trace     BILIRUBIN,UA neg     GLUCOSE, UA neg      COLOR,UA yellow     CLARITY,UA cloudy    ]  Lab Results   Component Value Date    PSA 1 53 2022     Lab Results   Component Value Date    CALCIUM 9 0 2022    K 4 6 2022    CO2 25 2022     2022    BUN 16 2022    CREATININE 0 84 2022     No results found for: WBC, HGB, HCT, MCV, PLT

## 2022-10-07 NOTE — PATIENT INSTRUCTIONS
Transurethral Prostatectomy   AMBULATORY CARE:   What you need to know about a transurethral prostatectomy (TURP):  A TURP is surgery to remove part or all of your prostate gland  This surgery is also called transurethral resection of the prostate  How to prepare for a TURP:   Your surgeon will tell you how to prepare  You may be told not to eat or drink anything after midnight on the day of surgery  Arrange to have someone drive you home after surgery  Tell your surgeon about all the medicines you currently take  He or she will tell you if you need to stop any medicine before surgery, and when to stop  He or she will tell you which medicines to take or not take on the day of surgery  Tell your surgeon if you have heart disease or blood clotting problems  You may be given medicine to shrink the size of your prostate  You may also be given antibiotics to help prevent or treat a bacterial infection  Tell your surgeon if you had an allergic reaction to antibiotics, anesthesia, or other medicine  You may need blood and urine tests  You may also need a rectal exam to check the size of your prostate  What will happen during a TURP:   You may be given anesthesia to make you lose feeling from the waist down, or to keep you asleep during surgery  Your surgeon will insert a resectoscope through your urethra  A resectoscope is a tube with a small monitor on the end  The monitor shows your prostate on a screen during surgery  Your bladder may be filled with fluid during surgery  Heat that is produced by the resectoscope will be used to remove part, or all, of your prostate  Heat will also be used to stop bleeding in the surgery area  Fluid is used to wash away extra tissue and blood  The resectoscope will be removed from your urethra  A catheter (soft tube) will be put through your urethra and into your bladder  The catheter will be left in place to drain urine out of your body and into a bag      What to expect after a TURP:  A Carcamo catheter is inserted to drain urine into a bag  Your healthcare provider will tell you how to clean around the catheter if you go home with one  You will need to clean the area 2 times a day to prevent infection  You may have feelings of urgency and difficulty controlling your urine  You may have pain when you urinate and also a small amount of blood in your urine  You may also have a problem getting an erection and keeping one  Risks of a TURP:   Your prostate, bladder, or urethra may be damaged  Your urethra or part of your bladder may grow narrow  This can make it difficult or painful to urinate  You may feel like you need to urinate often, or have trouble controlling when you urinate  You may get blood clots in your urine that can block your urethra  You may develop a urinary tract infection, or an infection in the surgery area  You may have trouble getting an erection or ejaculating  You may develop TURP syndrome, which can cause dizziness, fatigue, stomach pain, and vomiting  If you had a partial resection of the prostate, the part of your prostate that was not removed may grow too large  This can cause your signs and symptoms to return, and you may need to have surgery again  Seek care immediately if:   You have severe abdominal or back pain  You are dizzy or confused  You have abdominal pain, nausea, and vomiting  Your heartbeat is slower than usual     Call your doctor or urologist if:   You urinate little or not at all  You have a fever  You have new or more blood in your urine  You have trouble starting to urinate, or have a weak stream of urine when you urinate  You feel like you have a full bladder, even after you urinate  You often wake up during the night to urinate  You feel pain or pressure in your lower abdomen  Your urine looks cloudy, and smells bad  You have questions or concerns about your condition or care      Medicines: You may need any of the following:  Prescription pain medicine  may be given  Ask your healthcare provider how to take this medicine safely  Some prescription pain medicines contain acetaminophen  Do not take other medicines that contain acetaminophen without talking to your healthcare provider  Too much acetaminophen may cause liver damage  Prescription pain medicine may cause constipation  Ask your healthcare provider how to prevent or treat constipation  Antibiotics  prevent or fight an infection caused by bacteria  Take your medicine as directed  Contact your healthcare provider if you think your medicine is not helping or if you have side effects  Tell him or her if you are allergic to any medicine  Keep a list of the medicines, vitamins, and herbs you take  Include the amounts, and when and why you take them  Bring the list or the pill bottles to follow-up visits  Carry your medicine list with you in case of an emergency  Carcamo catheter care:  Keep the bag below your waist  If the bag is too high, urine will flow back into your bladder  This can cause an infection  Do not pull on the catheter  This may cause pain and bleeding, and the catheter may come out  Do not let the catheter tubing kink or twist  A kink or twist will block the flow of urine  Bladder control:  After surgery, you may leak urine and have trouble controlling when you urinate  The following can help decrease or manage urine leakage:  Drink fluids as directed  Fluids may help your kidneys and bladder work properly  Fluids can also decrease your chance for infections  Ask your healthcare provider which fluids are best for you and how much you should drink  Do not have caffeine  Caffeine can cause problems with bladder control and increase your need to urinate  Wear a pad or adult diapers, if needed  These may help to absorb leaking urine and decrease odor  Do pelvic floor muscle exercises    Pelvic floor muscle exercises, also called Kegels, may help improve your bladder control  These exercises are done by tightening and relaxing your pelvic muscles  Ask how to do pelvic floor muscle exercises, and how often to do them  Activity:  Ask when it is okay for you to return to work and activities, or to have sex  Follow up with your surgeon or urologist as directed: You may need to return to make sure you do not have an infection, or to have your Carcamo catheter removed  Write down your questions so you remember to ask them during your visits  © Copyright [employer] 2022 Information is for End User's use only and may not be sold, redistributed or otherwise used for commercial purposes  All illustrations and images included in CareNotes® are the copyrighted property of [employer]  or Krissy Mckeon  The above information is an  only  It is not intended as medical advice for individual conditions or treatments  Talk to your doctor, nurse or pharmacist before following any medical regimen to see if it is safe and effective for you

## 2022-10-09 LAB — BACTERIA UR CULT: ABNORMAL

## 2022-10-10 ENCOUNTER — TELEPHONE (OUTPATIENT)
Dept: UROLOGY | Facility: CLINIC | Age: 67
End: 2022-10-10

## 2022-10-10 ENCOUNTER — TELEPHONE (OUTPATIENT)
Dept: OTHER | Facility: HOSPITAL | Age: 67
End: 2022-10-10

## 2022-10-10 DIAGNOSIS — N30.90 CYSTITIS: Primary | ICD-10-CM

## 2022-10-10 RX ORDER — SULFAMETHOXAZOLE AND TRIMETHOPRIM 800; 160 MG/1; MG/1
1 TABLET ORAL 2 TIMES DAILY
Qty: 10 TABLET | Refills: 0 | Status: SHIPPED | OUTPATIENT
Start: 2022-10-10 | End: 2022-10-15

## 2022-10-10 NOTE — TELEPHONE ENCOUNTER
[facility] patient  BPH  Seen last week for routine visit    Did have some new bladder symptoms- urine culture grew staph- I am sending him Bactrim abx twice a day for five days to start today

## 2022-10-10 NOTE — TELEPHONE ENCOUNTER
Called and left detailed message per comm consent  Advised recent urine culture positive, bactrim sent into preferred pharmacy  He should begin today if able, its prescribed for 2 times per day for 5 days  Asking for a call back if any further questions, number provided

## 2022-10-10 NOTE — TELEPHONE ENCOUNTER
LM for patient in regards to surgery scheduling     Holding 11/10 with LIZZETTE  Asked patient to call back to schedule

## 2022-10-12 PROBLEM — Z11.59 SPECIAL SCREENING EXAMINATION FOR VIRAL DISEASE: Status: RESOLVED | Noted: 2020-06-11 | Resolved: 2022-10-12

## 2022-10-12 NOTE — TELEPHONE ENCOUNTER
LM x2 for patient in regards to surgery scheduling     Holding 11/10 with ZP  Asked patient to call back to schedule

## 2022-10-18 ENCOUNTER — TELEPHONE (OUTPATIENT)
Dept: OTHER | Facility: OTHER | Age: 67
End: 2022-10-18

## 2022-10-18 DIAGNOSIS — N13.8 BENIGN PROSTATIC HYPERPLASIA WITH URINARY OBSTRUCTION: Primary | ICD-10-CM

## 2022-10-18 DIAGNOSIS — N40.1 BENIGN PROSTATIC HYPERPLASIA WITH URINARY OBSTRUCTION: Primary | ICD-10-CM

## 2022-10-18 NOTE — TELEPHONE ENCOUNTER
Patient would like the information regarding the TURP that was supposed to be sent to him in the mail emailed to him at [email or web address]

## 2022-10-19 RX ORDER — FINASTERIDE 5 MG/1
5 TABLET, FILM COATED ORAL DAILY
Qty: 30 TABLET | Refills: 1 | Status: SHIPPED | OUTPATIENT
Start: 2022-10-19

## 2022-10-19 NOTE — TELEPHONE ENCOUNTER
Wife calling to say she was supposed to receive information in regards to procedures that were recommended and they were to decide which procedure thaty would wish to proceed with

## 2022-10-19 NOTE — TELEPHONE ENCOUNTER
Discussed TURP vs HoLEP with Carlos at his visit  He decided on TURP which is scheduled  He should start finasteride 5mg daily now to reduce prostatic bleeding/varices leading up to and during surgery   If he was still considering holep could see if Dr Ursula Gant has any consult time prior thanks

## 2022-10-19 NOTE — TELEPHONE ENCOUNTER
Called and spoke with patient  Patient scheduled 11/10/2022 at [facility] with Dr Beatris Del Cid for a TURP  Verbally went over instructions with patient      -nothing to eat or drink after midnight the night prior    - to and from  -stop all blood thinning medications 7 days prior  -urine culture, blood work and EKG to be done 2 weeks prior  Surgical packet emailed to patient per request 10/19/2022

## 2022-10-21 NOTE — TELEPHONE ENCOUNTER
Called the patient and left a voicemail asking if he still needs information on the TURP and Holep procedures  Patient has decided to proceed with TURP, unsure if information is still needed  Asked patient to call the office if he still wants the information

## 2022-10-28 ENCOUNTER — LAB REQUISITION (OUTPATIENT)
Dept: LAB | Facility: HOSPITAL | Age: 67
End: 2022-10-28
Payer: COMMERCIAL

## 2022-10-28 ENCOUNTER — APPOINTMENT (OUTPATIENT)
Dept: LAB | Facility: CLINIC | Age: 67
End: 2022-10-28
Payer: COMMERCIAL

## 2022-10-28 ENCOUNTER — OFFICE VISIT (OUTPATIENT)
Dept: LAB | Facility: CLINIC | Age: 67
End: 2022-10-28
Payer: COMMERCIAL

## 2022-10-28 DIAGNOSIS — N39.8 VOIDING DYSFUNCTION: ICD-10-CM

## 2022-10-28 DIAGNOSIS — E78.2 MIXED HYPERLIPIDEMIA: ICD-10-CM

## 2022-10-28 DIAGNOSIS — I10 PRIMARY HYPERTENSION: ICD-10-CM

## 2022-10-28 DIAGNOSIS — N40.1 BENIGN PROSTATIC HYPERPLASIA WITH LOWER URINARY TRACT SYMPTOMS: ICD-10-CM

## 2022-10-28 DIAGNOSIS — N13.8 OTHER OBSTRUCTIVE AND REFLUX UROPATHY: ICD-10-CM

## 2022-10-28 DIAGNOSIS — R39.12 BENIGN PROSTATIC HYPERPLASIA WITH WEAK URINARY STREAM: ICD-10-CM

## 2022-10-28 DIAGNOSIS — Z11.59 NEED FOR HEPATITIS C SCREENING TEST: ICD-10-CM

## 2022-10-28 DIAGNOSIS — N13.8 ENLARGED PROSTATE WITH URINARY OBSTRUCTION: ICD-10-CM

## 2022-10-28 DIAGNOSIS — R39.12 WEAK URINARY STREAM: ICD-10-CM

## 2022-10-28 DIAGNOSIS — R39.12 POOR URINARY STREAM: ICD-10-CM

## 2022-10-28 DIAGNOSIS — Z12.5 SCREENING FOR PROSTATE CANCER: ICD-10-CM

## 2022-10-28 DIAGNOSIS — N40.1 BENIGN PROSTATIC HYPERPLASIA WITH WEAK URINARY STREAM: ICD-10-CM

## 2022-10-28 DIAGNOSIS — N40.1 ENLARGED PROSTATE WITH URINARY OBSTRUCTION: ICD-10-CM

## 2022-10-28 DIAGNOSIS — R39.89 SUSPECTED UTI: ICD-10-CM

## 2022-10-28 LAB
ABO GROUP BLD: NORMAL
ALBUMIN SERPL BCP-MCNC: 3.2 G/DL (ref 3.5–5)
ALP SERPL-CCNC: 93 U/L (ref 46–116)
ALT SERPL W P-5'-P-CCNC: 20 U/L (ref 12–78)
ANION GAP SERPL CALCULATED.3IONS-SCNC: 5 MMOL/L (ref 4–13)
AST SERPL W P-5'-P-CCNC: 12 U/L (ref 5–45)
ATRIAL RATE: 61 BPM
ATRIAL RATE: 63 BPM
BASOPHILS # BLD AUTO: 0.03 THOUSANDS/ÂΜL (ref 0–0.1)
BASOPHILS NFR BLD AUTO: 1 % (ref 0–1)
BILIRUB SERPL-MCNC: 0.53 MG/DL (ref 0.2–1)
BLD GP AB SCN SERPL QL: NEGATIVE
BUN SERPL-MCNC: 19 MG/DL (ref 5–25)
CALCIUM ALBUM COR SERPL-MCNC: 9.5 MG/DL (ref 8.3–10.1)
CALCIUM SERPL-MCNC: 8.9 MG/DL (ref 8.3–10.1)
CHLORIDE SERPL-SCNC: 104 MMOL/L (ref 96–108)
CHOLEST SERPL-MCNC: 169 MG/DL
CO2 SERPL-SCNC: 26 MMOL/L (ref 21–32)
CREAT SERPL-MCNC: 0.87 MG/DL (ref 0.6–1.3)
EOSINOPHIL # BLD AUTO: 0.24 THOUSAND/ÂΜL (ref 0–0.61)
EOSINOPHIL NFR BLD AUTO: 5 % (ref 0–6)
ERYTHROCYTE [DISTWIDTH] IN BLOOD BY AUTOMATED COUNT: 12.9 % (ref 11.6–15.1)
GFR SERPL CREATININE-BSD FRML MDRD: 89 ML/MIN/1.73SQ M
GLUCOSE P FAST SERPL-MCNC: 100 MG/DL (ref 65–99)
HCT VFR BLD AUTO: 40.2 % (ref 36.5–49.3)
HCV AB SER QL: NORMAL
HDLC SERPL-MCNC: 52 MG/DL
HGB BLD-MCNC: 13.1 G/DL (ref 12–17)
IMM GRANULOCYTES # BLD AUTO: 0.01 THOUSAND/UL (ref 0–0.2)
IMM GRANULOCYTES NFR BLD AUTO: 0 % (ref 0–2)
LDLC SERPL CALC-MCNC: 104 MG/DL (ref 0–100)
LYMPHOCYTES # BLD AUTO: 1.65 THOUSANDS/ÂΜL (ref 0.6–4.47)
LYMPHOCYTES NFR BLD AUTO: 31 % (ref 14–44)
MCH RBC QN AUTO: 31.3 PG (ref 26.8–34.3)
MCHC RBC AUTO-ENTMCNC: 32.6 G/DL (ref 31.4–37.4)
MCV RBC AUTO: 96 FL (ref 82–98)
MONOCYTES # BLD AUTO: 0.62 THOUSAND/ÂΜL (ref 0.17–1.22)
MONOCYTES NFR BLD AUTO: 12 % (ref 4–12)
NEUTROPHILS # BLD AUTO: 2.76 THOUSANDS/ÂΜL (ref 1.85–7.62)
NEUTS SEG NFR BLD AUTO: 51 % (ref 43–75)
NONHDLC SERPL-MCNC: 117 MG/DL
NRBC BLD AUTO-RTO: 0 /100 WBCS
P AXIS: 12 DEGREES
P AXIS: 4 DEGREES
PLATELET # BLD AUTO: 272 THOUSANDS/UL (ref 149–390)
PMV BLD AUTO: 9.4 FL (ref 8.9–12.7)
POTASSIUM SERPL-SCNC: 3.9 MMOL/L (ref 3.5–5.3)
PR INTERVAL: 166 MS
PR INTERVAL: 176 MS
PROT SERPL-MCNC: 7.1 G/DL (ref 6.4–8.4)
PSA SERPL-MCNC: 1.6 NG/ML (ref 0–4)
QRS AXIS: 53 DEGREES
QRS AXIS: 62 DEGREES
QRSD INTERVAL: 76 MS
QRSD INTERVAL: 80 MS
QT INTERVAL: 396 MS
QT INTERVAL: 398 MS
QTC INTERVAL: 398 MS
QTC INTERVAL: 407 MS
RBC # BLD AUTO: 4.18 MILLION/UL (ref 3.88–5.62)
RH BLD: POSITIVE
SODIUM SERPL-SCNC: 135 MMOL/L (ref 135–147)
SPECIMEN EXPIRATION DATE: NORMAL
T WAVE AXIS: 55 DEGREES
T WAVE AXIS: 59 DEGREES
TRIGL SERPL-MCNC: 65 MG/DL
VENTRICULAR RATE: 61 BPM
VENTRICULAR RATE: 63 BPM
WBC # BLD AUTO: 5.31 THOUSAND/UL (ref 4.31–10.16)

## 2022-10-28 PROCEDURE — 87086 URINE CULTURE/COLONY COUNT: CPT

## 2022-10-28 PROCEDURE — 80053 COMPREHEN METABOLIC PANEL: CPT

## 2022-10-28 PROCEDURE — 85025 COMPLETE CBC W/AUTO DIFF WBC: CPT

## 2022-10-28 PROCEDURE — 86900 BLOOD TYPING SEROLOGIC ABO: CPT | Performed by: UROLOGY

## 2022-10-28 PROCEDURE — 93005 ELECTROCARDIOGRAM TRACING: CPT

## 2022-10-28 PROCEDURE — G0103 PSA SCREENING: HCPCS

## 2022-10-28 PROCEDURE — 86850 RBC ANTIBODY SCREEN: CPT | Performed by: UROLOGY

## 2022-10-28 PROCEDURE — 86803 HEPATITIS C AB TEST: CPT

## 2022-10-28 PROCEDURE — 36415 COLL VENOUS BLD VENIPUNCTURE: CPT

## 2022-10-28 PROCEDURE — 86901 BLOOD TYPING SEROLOGIC RH(D): CPT | Performed by: UROLOGY

## 2022-10-28 PROCEDURE — 80061 LIPID PANEL: CPT

## 2022-10-29 LAB — BACTERIA UR CULT: NORMAL

## 2022-11-05 DIAGNOSIS — I10 ESSENTIAL HYPERTENSION: ICD-10-CM

## 2022-11-05 RX ORDER — LISINOPRIL 20 MG/1
TABLET ORAL
Qty: 90 TABLET | Refills: 3 | Status: SHIPPED | OUTPATIENT
Start: 2022-11-05

## 2022-11-07 ENCOUNTER — ANESTHESIA EVENT (OUTPATIENT)
Dept: PERIOP | Facility: HOSPITAL | Age: 67
End: 2022-11-07

## 2022-11-07 NOTE — PRE-PROCEDURE INSTRUCTIONS
Pre-Surgery Instructions:   Medication Instructions   • aspirin 81 MG tablet Instructions provided by MD-surgeon gave pt instructions - pt reports last dose on 11/6/22    • CO-ENZYME Q10 PO Stop taking 7 days prior to surgery  • lisinopril (ZESTRIL) 20 mg tablet Hold day of surgery  • Multiple Vitamins-Minerals (MULTIVITAL-M) TABS Stop taking 7 days prior to surgery  • simvastatin (ZOCOR) 80 mg tablet Take day of surgery  with sip of water    • tamsulosin (FLOMAX) 0 4 mg Take night before surgery     Reviewed all medications and instructions for DOS  Reviewed all showering instructions and COVID visitation policy  Pt aware that [facility]  is location for DOS, instructed that pt pre op nurse will call on 11/9/22  to give specific instructions for DOS  Pt instructed to bring photo ID and insurance card for DOS, remove all jewelry and  NO valuables for DOS  Pt instructed to use only Tylenol between now 11/7/22 and DOS, NO NSAID products  Pt informed transport is needed for DOS due to receiving anesthesia  Instructed patient to minimize alcohol use prior to surgery  No alcohol 24 hours prior to surgery to reduce risk of dehydration  Pt verbalized understanding of all instructions given and reviewed for DOS  Pt reports NOT vaccinated for COVID  Pt did receive instructions from surgeon to hold ASA and vitamins  My Surgical Experience    The following information was developed to assist you to prepare for your operation  What do I need to do before coming to the hospital?  • Arrange for a responsible person to drive you to and from the hospital   • Arrange care for your children at home  Children are not allowed in the recovery areas of the hospital  • Plan to wear clothing that is easy to put on and take off  If you are having shoulder surgery, wear a shirt that buttons or zippers in the front  Bathing  o Shower the evening before and the morning of your surgery with an antibacterial soap   Please refer to the Pre Op Showering Instructions for Surgery Patients Sheet   o Remove nail polish and all body piercing jewelry  o Do not shave any body part for at least 24 hours before surgery-this includes face, arms, legs and upper body  Food  o Nothing to eat or drink after midnight the night before your surgery  This includes candy and chewing gum  o Exception: If your surgery is after 12:00pm (noon), you may have clear liquids such as 7-Up®, ginger ale, apple or cranberry juice, Jell-O®, water, or clear broth until 8:00 am  o Do not drink milk or juice with pulp on the morning before surgery  o Do not drink alcohol 24 hours before surgery  Medicine  o Follow instructions you received from your surgeon about which medicines you may take on the day of surgery  o If instructed to take medicine on the morning of surgery, take pills with just a small sip of water  Call your prescribing doctor for specific infroamtion on what to do if you take insulin    What should I bring to the hospital?    Bring:  • Crutches or a walker, if you have them, for foot or knee surgery  • A list of the daily medicines, vitamins, minerals, herbals and nutritional supplements you take  Include the dosages of medicines and the time you take them each day  • Glasses, dentures or hearing aids  • Minimal clothing; you will be wearing hospital sleepwear  • Photo ID; required to verify your identity  • If you have a Living Will or Power of , bring a copy of the documents  • If you have an ostomy, bring an extra pouch and any supplies you use    Do not bring  • Medicines or inhalers  • Money, valuables or jewelry    What other information should I know about the day of surgery? • Notify your surgeons if you develop a cold, sore throat, cough, fever, rash or any other illness    • Report to the Ambulatory Surgical/Same Day Surgery Unit  • You will be instructed to stop at Registration only if you have not been pre-registered  • Inform your  fi they do not stay that they will be asked by the staff to leave a phone number where they can be reached  • Be available to be reached before surgery  In the event the operating room schedule changes, you may be asked to come in earlier or later than expected    *It is important to tell your doctor and others involved in your health care if you are taking or have been taking any non-prescription drugs, vitamins, minerals, herbals or other nutritional supplements   Any of these may interact with some food or medicines and cause a reaction

## 2022-11-10 ENCOUNTER — HOSPITAL ENCOUNTER (OUTPATIENT)
Facility: HOSPITAL | Age: 67
Setting detail: OUTPATIENT SURGERY
Discharge: HOME | End: 2022-11-11
Attending: UROLOGY | Admitting: UROLOGY

## 2022-11-10 ENCOUNTER — ANESTHESIA (OUTPATIENT)
Dept: PERIOP | Facility: HOSPITAL | Age: 67
End: 2022-11-10

## 2022-11-10 DIAGNOSIS — R39.12 BENIGN PROSTATIC HYPERPLASIA WITH WEAK URINARY STREAM: ICD-10-CM

## 2022-11-10 DIAGNOSIS — N40.1 BENIGN PROSTATIC HYPERPLASIA WITH WEAK URINARY STREAM: ICD-10-CM

## 2022-11-10 PROBLEM — E66.9 OBESITY (BMI 30.0-34.9): Status: RESOLVED | Noted: 2019-06-20 | Resolved: 2022-11-10

## 2022-11-10 PROBLEM — E66.811 OBESITY (BMI 30.0-34.9): Status: RESOLVED | Noted: 2019-06-20 | Resolved: 2022-11-10

## 2022-11-10 LAB
FLUAV RNA RESP QL NAA+PROBE: NEGATIVE
FLUBV RNA RESP QL NAA+PROBE: NEGATIVE
RSV RNA RESP QL NAA+PROBE: NEGATIVE
SARS-COV-2 RNA RESP QL NAA+PROBE: NEGATIVE

## 2022-11-10 RX ORDER — ATROPA BELLADONNA AND OPIUM 16.2; 3 MG/1; MG/1
30 SUPPOSITORY RECTAL EVERY 8 HOURS PRN
Status: DISCONTINUED | OUTPATIENT
Start: 2022-11-10 | End: 2022-11-11 | Stop reason: HOSPADM

## 2022-11-10 RX ORDER — ATORVASTATIN CALCIUM 40 MG/1
40 TABLET, FILM COATED ORAL
Refills: 0 | Status: DISCONTINUED | OUTPATIENT
Start: 2022-11-10 | End: 2022-11-11 | Stop reason: HOSPADM

## 2022-11-10 RX ORDER — ATROPA BELLADONNA AND OPIUM 16.2; 3 MG/1; MG/1
SUPPOSITORY RECTAL AS NEEDED
Status: DISCONTINUED | OUTPATIENT
Start: 2022-11-10 | End: 2022-11-10 | Stop reason: HOSPADM

## 2022-11-10 RX ORDER — LIDOCAINE HYDROCHLORIDE 20 MG/ML
INJECTION, SOLUTION EPIDURAL; INFILTRATION; INTRACAUDAL; PERINEURAL AS NEEDED
Status: DISCONTINUED | OUTPATIENT
Start: 2022-11-10 | End: 2022-11-10

## 2022-11-10 RX ORDER — MEPERIDINE HYDROCHLORIDE 25 MG/ML
12.5 INJECTION INTRAMUSCULAR; INTRAVENOUS; SUBCUTANEOUS
Status: DISCONTINUED | OUTPATIENT
Start: 2022-11-10 | End: 2022-11-10 | Stop reason: HOSPADM

## 2022-11-10 RX ORDER — OXYBUTYNIN CHLORIDE 5 MG/1
5 TABLET, EXTENDED RELEASE ORAL DAILY
Status: DISCONTINUED | OUTPATIENT
Start: 2022-11-10 | End: 2022-11-11 | Stop reason: HOSPADM

## 2022-11-10 RX ORDER — ONDANSETRON 2 MG/ML
4 INJECTION INTRAMUSCULAR; INTRAVENOUS ONCE AS NEEDED
Status: DISCONTINUED | OUTPATIENT
Start: 2022-11-10 | End: 2022-11-10 | Stop reason: HOSPADM

## 2022-11-10 RX ORDER — HYDROMORPHONE HCL/PF 1 MG/ML
0.5 SYRINGE (ML) INJECTION
Status: DISCONTINUED | OUTPATIENT
Start: 2022-11-10 | End: 2022-11-10 | Stop reason: HOSPADM

## 2022-11-10 RX ORDER — OXYCODONE HYDROCHLORIDE 10 MG/1
10 TABLET ORAL EVERY 4 HOURS PRN
Status: DISCONTINUED | OUTPATIENT
Start: 2022-11-10 | End: 2022-11-11 | Stop reason: HOSPADM

## 2022-11-10 RX ORDER — MAGNESIUM HYDROXIDE 1200 MG/15ML
LIQUID ORAL AS NEEDED
Status: DISCONTINUED | OUTPATIENT
Start: 2022-11-10 | End: 2022-11-10 | Stop reason: HOSPADM

## 2022-11-10 RX ORDER — CEFAZOLIN SODIUM 1 G/50ML
1000 SOLUTION INTRAVENOUS EVERY 8 HOURS
Status: COMPLETED | OUTPATIENT
Start: 2022-11-10 | End: 2022-11-11

## 2022-11-10 RX ORDER — LISINOPRIL 20 MG/1
20 TABLET ORAL DAILY
Status: DISCONTINUED | OUTPATIENT
Start: 2022-11-10 | End: 2022-11-11 | Stop reason: HOSPADM

## 2022-11-10 RX ORDER — FINASTERIDE 5 MG/1
5 TABLET, FILM COATED ORAL DAILY
Status: DISCONTINUED | OUTPATIENT
Start: 2022-11-10 | End: 2022-11-11 | Stop reason: HOSPADM

## 2022-11-10 RX ORDER — HYDROMORPHONE HCL/PF 1 MG/ML
0.5 SYRINGE (ML) INJECTION EVERY 2 HOUR PRN
Status: DISCONTINUED | OUTPATIENT
Start: 2022-11-10 | End: 2022-11-11 | Stop reason: HOSPADM

## 2022-11-10 RX ORDER — ACETAMINOPHEN 325 MG/1
650 TABLET ORAL EVERY 6 HOURS SCHEDULED
Status: DISCONTINUED | OUTPATIENT
Start: 2022-11-10 | End: 2022-11-11 | Stop reason: HOSPADM

## 2022-11-10 RX ORDER — PROPOFOL 10 MG/ML
INJECTION, EMULSION INTRAVENOUS AS NEEDED
Status: DISCONTINUED | OUTPATIENT
Start: 2022-11-10 | End: 2022-11-10

## 2022-11-10 RX ORDER — GABAPENTIN 300 MG/1
300 CAPSULE ORAL
Status: DISCONTINUED | OUTPATIENT
Start: 2022-11-10 | End: 2022-11-11 | Stop reason: HOSPADM

## 2022-11-10 RX ORDER — SODIUM CHLORIDE, SODIUM LACTATE, POTASSIUM CHLORIDE, CALCIUM CHLORIDE 600; 310; 30; 20 MG/100ML; MG/100ML; MG/100ML; MG/100ML
125 INJECTION, SOLUTION INTRAVENOUS CONTINUOUS
Status: DISPENSED | OUTPATIENT
Start: 2022-11-10 | End: 2022-11-10

## 2022-11-10 RX ORDER — MAGNESIUM HYDROXIDE 1200 MG/15ML
3000 LIQUID ORAL CONTINUOUS
Status: DISCONTINUED | OUTPATIENT
Start: 2022-11-10 | End: 2022-11-11 | Stop reason: HOSPADM

## 2022-11-10 RX ORDER — ONDANSETRON 2 MG/ML
INJECTION INTRAMUSCULAR; INTRAVENOUS AS NEEDED
Status: DISCONTINUED | OUTPATIENT
Start: 2022-11-10 | End: 2022-11-10

## 2022-11-10 RX ORDER — SODIUM CHLORIDE, SODIUM LACTATE, POTASSIUM CHLORIDE, CALCIUM CHLORIDE 600; 310; 30; 20 MG/100ML; MG/100ML; MG/100ML; MG/100ML
125 INJECTION, SOLUTION INTRAVENOUS CONTINUOUS
Status: DISCONTINUED | OUTPATIENT
Start: 2022-11-10 | End: 2022-11-10 | Stop reason: SDUPTHER

## 2022-11-10 RX ORDER — ONDANSETRON 2 MG/ML
4 INJECTION INTRAMUSCULAR; INTRAVENOUS EVERY 6 HOURS PRN
Status: DISCONTINUED | OUTPATIENT
Start: 2022-11-10 | End: 2022-11-11 | Stop reason: HOSPADM

## 2022-11-10 RX ORDER — SENNOSIDES 8.6 MG
1 TABLET ORAL DAILY
Status: DISCONTINUED | OUTPATIENT
Start: 2022-11-10 | End: 2022-11-11 | Stop reason: HOSPADM

## 2022-11-10 RX ORDER — MIDAZOLAM HYDROCHLORIDE 2 MG/2ML
INJECTION, SOLUTION INTRAMUSCULAR; INTRAVENOUS AS NEEDED
Status: DISCONTINUED | OUTPATIENT
Start: 2022-11-10 | End: 2022-11-10

## 2022-11-10 RX ORDER — CEFAZOLIN SODIUM 2 G/50ML
2000 SOLUTION INTRAVENOUS ONCE
Status: COMPLETED | OUTPATIENT
Start: 2022-11-10 | End: 2022-11-10

## 2022-11-10 RX ORDER — FENTANYL CITRATE/PF 50 MCG/ML
25 SYRINGE (ML) INJECTION
Status: DISCONTINUED | OUTPATIENT
Start: 2022-11-10 | End: 2022-11-10 | Stop reason: HOSPADM

## 2022-11-10 RX ORDER — FENTANYL CITRATE 50 UG/ML
INJECTION, SOLUTION INTRAMUSCULAR; INTRAVENOUS AS NEEDED
Status: DISCONTINUED | OUTPATIENT
Start: 2022-11-10 | End: 2022-11-10

## 2022-11-10 RX ORDER — DEXAMETHASONE SODIUM PHOSPHATE 10 MG/ML
INJECTION, SOLUTION INTRAMUSCULAR; INTRAVENOUS AS NEEDED
Status: DISCONTINUED | OUTPATIENT
Start: 2022-11-10 | End: 2022-11-10

## 2022-11-10 RX ORDER — TAMSULOSIN HYDROCHLORIDE 0.4 MG/1
0.4 CAPSULE ORAL
Status: DISCONTINUED | OUTPATIENT
Start: 2022-11-10 | End: 2022-11-11 | Stop reason: HOSPADM

## 2022-11-10 RX ORDER — PHENAZOPYRIDINE HYDROCHLORIDE 100 MG/1
200 TABLET, FILM COATED ORAL
Status: DISCONTINUED | OUTPATIENT
Start: 2022-11-10 | End: 2022-11-11 | Stop reason: HOSPADM

## 2022-11-10 RX ORDER — OXYCODONE HYDROCHLORIDE 5 MG/1
5 TABLET ORAL EVERY 4 HOURS PRN
Status: DISCONTINUED | OUTPATIENT
Start: 2022-11-10 | End: 2022-11-11 | Stop reason: HOSPADM

## 2022-11-10 RX ADMIN — SENNOSIDES 8.6 MG: 8.6 TABLET, FILM COATED ORAL at 13:59

## 2022-11-10 RX ADMIN — SODIUM CHLORIDE, SODIUM LACTATE, POTASSIUM CHLORIDE, AND CALCIUM CHLORIDE 125 ML/HR: .6; .31; .03; .02 INJECTION, SOLUTION INTRAVENOUS at 09:15

## 2022-11-10 RX ADMIN — PHENAZOPYRIDINE 200 MG: 100 TABLET ORAL at 13:59

## 2022-11-10 RX ADMIN — FINASTERIDE 5 MG: 5 TABLET, FILM COATED ORAL at 13:58

## 2022-11-10 RX ADMIN — ONDANSETRON 4 MG: 2 INJECTION INTRAMUSCULAR; INTRAVENOUS at 11:12

## 2022-11-10 RX ADMIN — CEFAZOLIN SODIUM 1000 MG: 1 SOLUTION INTRAVENOUS at 17:44

## 2022-11-10 RX ADMIN — SODIUM CHLORIDE, SODIUM LACTATE, POTASSIUM CHLORIDE, AND CALCIUM CHLORIDE: .6; .31; .03; .02 INJECTION, SOLUTION INTRAVENOUS at 11:21

## 2022-11-10 RX ADMIN — LISINOPRIL 20 MG: 20 TABLET ORAL at 13:59

## 2022-11-10 RX ADMIN — FENTANYL CITRATE 50 MCG: 50 INJECTION INTRAMUSCULAR; INTRAVENOUS at 10:59

## 2022-11-10 RX ADMIN — FENTANYL CITRATE 50 MCG: 50 INJECTION INTRAMUSCULAR; INTRAVENOUS at 10:33

## 2022-11-10 RX ADMIN — FENTANYL CITRATE 50 MCG: 50 INJECTION INTRAMUSCULAR; INTRAVENOUS at 10:49

## 2022-11-10 RX ADMIN — MIDAZOLAM 2 MG: 1 INJECTION INTRAMUSCULAR; INTRAVENOUS at 10:28

## 2022-11-10 RX ADMIN — FENTANYL CITRATE 50 MCG: 50 INJECTION INTRAMUSCULAR; INTRAVENOUS at 10:38

## 2022-11-10 RX ADMIN — TAMSULOSIN HYDROCHLORIDE 0.4 MG: 0.4 CAPSULE ORAL at 17:30

## 2022-11-10 RX ADMIN — LIDOCAINE HYDROCHLORIDE 100 MG: 20 INJECTION, SOLUTION EPIDURAL; INFILTRATION; INTRACAUDAL; PERINEURAL at 10:33

## 2022-11-10 RX ADMIN — OXYBUTYNIN CHLORIDE 5 MG: 5 TABLET, EXTENDED RELEASE ORAL at 13:58

## 2022-11-10 RX ADMIN — SODIUM CHLORIDE, SODIUM LACTATE, POTASSIUM CHLORIDE, AND CALCIUM CHLORIDE 125 ML/HR: .6; .31; .03; .02 INJECTION, SOLUTION INTRAVENOUS at 13:56

## 2022-11-10 RX ADMIN — DEXAMETHASONE SODIUM PHOSPHATE 5 MG: 10 INJECTION, SOLUTION INTRAMUSCULAR; INTRAVENOUS at 10:34

## 2022-11-10 RX ADMIN — ACETAMINOPHEN 650 MG: 325 TABLET, FILM COATED ORAL at 13:58

## 2022-11-10 RX ADMIN — PROPOFOL 200 MG: 10 INJECTION, EMULSION INTRAVENOUS at 10:34

## 2022-11-10 RX ADMIN — CEFAZOLIN SODIUM 2000 MG: 2 SOLUTION INTRAVENOUS at 10:26

## 2022-11-10 RX ADMIN — SODIUM CHLORIDE FOR IRRIGATION 3000 ML: 0.9 SOLUTION IRRIGATION at 14:01

## 2022-11-10 RX ADMIN — ATORVASTATIN CALCIUM 40 MG: 40 TABLET, FILM COATED ORAL at 17:30

## 2022-11-10 NOTE — ANESTHESIA POSTPROCEDURE EVALUATION
Post-Op Assessment Note    CV Status:  Stable    Pain management: adequate     Mental Status:  Alert and awake   Hydration Status:  Euvolemic   PONV Controlled:  Controlled   Airway Patency:  Patent      Post Op Vitals Reviewed: Yes      Staff: Anesthesiologist         No complications documented      BP      Temp     Pulse 68 (11/10/22 1205)   Resp 20 (11/10/22 1205)    SpO2 99 % (11/10/22 1205)

## 2022-11-10 NOTE — OP NOTE
OPERATIVE REPORT  PATIENT NAME: Melanie Hernandez    :  1955  MRN: [id number]  Pt Location: [facility] OR ROOM 03    SURGERY DATE: 11/10/2022    Surgeon(s) and Role:     * Clarence Poon MD - Primary    Preop Diagnosis:  Benign prostatic hyperplasia with weak urinary stream [N40 1, R39 12]    Post-Op Diagnosis Codes: * Benign prostatic hyperplasia with weak urinary stream [N40 1, R39 12]    Procedure(s) (LRB):  (TURP) (N/A)    Specimen(s):  ID Type Source Tests Collected by Time Destination   1 :  Tissue Prostate TISSUE EXAM Clarence Poon MD 11/10/2022 1057        Estimated Blood Loss:   Minimal    Drains:  Urethral Catheter Latex; Three way 24 Fr  (Active)   Number of days: 0       Continuous Bladder Irrigation (Active)   Number of days: 0       Anesthesia Type:   General    Operative Indications:  Benign prostatic hyperplasia with weak urinary stream [N40 1, R39 12]      Operative Findings:  1  Standard bipolar TURP  2  One area of open venous channel in the RIGHT anterior prostate, fulgurated with button electrode  3  Good hemostasis  4  50cc balloon on traction    Complications:   None    Procedure and Technique:  Melanie Hernandez is a 79y o -year-old male with intractable lower urinary tract symptoms despite medical management  Risk and benefits of TURP were discussed and reviewed  Informed consent was obtained  The patient is brought to the operative room on 11/10/2022  After the smooth induction of general LMA anesthesia, the patient was placed in the dorsal lithotomy position  Intravenous anabiotics were administered in the form of ancef  Venodyne boots were applied  A timeout was performed with all members of the operative team confirming the patient's identity and procedure to be performed  A 24 Turkish rigid continuous flow resectoscope sheath with 30° lens was inserted  The bladder was thoroughly inspected  There was bilateral lobar hyperplasia of the prostate   A large median lobe with intravesical protrusion of the prostate was noted  The bladder was entered  The bladder was thick-walled  There was no evidence of mucosal abnormalities or lesions  Ureteral orifices were visualized along the trigonal ridge  A standard bipolar saline TURP was performed  I first took down the median lobe followed by floor tissue from the bladder neck proximally to the verumontanum distally  At no point during the procedure did any resection occur distal to the vera montanum  Next I took down both lateral lobes of the prostate from the bladder neck proximally to the verumontanum distally  A moderate amount of anterior tissue then fell into the urethra and this was taken down as well  The Ellik last was utilized to remove all chips  The bladder was thoroughly reinspected  There were no chips remaining  Both ureteral orifices were visualized intact  Cautery was used for hemostasis  There was persistent bleeding from open venous channel in the RIGHT anterior surface  Button electrode exchanged to apply cautery and allow for control  A 24 Malay three-way hematuria Carcamo catheter was then inserted  50 cc were placed into the balloon  Carcamo was placed on traction and taped to patient's leg  Continuous bladder irrigation was initiated and was noted to be clear upon departure from the operating room  At the completion, a belladonna and opium suppository was placed per rectum  Overall the patient tolerated the procedure well and there were no complications  The patient was extubated in the operating room transferred to the PACU in stable condition at the conclusion of the case       Patient Disposition:  PACU         SIGNATURE: Afshin Nelson MD  DATE: November 10, 2022  TIME: 11:52 AM

## 2022-11-10 NOTE — H&P
UROLOGY H&P NOTE     CHIEF COMPLAINT   Cleve Bradshaw is a 79 y o  male with a complaint of   No chief complaint on file  History of Present Illness:     79 y o  male with lower urinary tract symptoms  Seen by the advanced practitioner team   Patient has a weak and hesitant urinary stream sometimes having to sit to start  He often has some issues with straining  Is following with colorectal surgery given hemorrhoids and blood in his stool, visit upcoming on the 15th of this month  Patient currently managed on Flomax single-dose a dinner and has been offered Cialis 5 mg daily which he has not taking at current  Patient reports that if he takes the Cialis 5 mg daily, he has some occasional chest pain  If he takes it intermittently, he has no side effects  Following procedure, patient attempted continued medical mgmt  Now requesting surgical intervention  Interested in Anay Nix      Lab Results   Component Value Date    PSA 1 6 10/28/2022    PSA 1 53 03/05/2022       Past Medical History:     Past Medical History:   Diagnosis Date   • Acute myocardial infarction [facility])    • CAD (coronary artery disease)    • Coronary artery disease     11/7/22 Pt reports needed one stent in LAD   • H/O heart artery stent     11/7/22 Pt reports one stent placed in LAD in 6/2004   • Hyperlipidemia    • Hypertension    • Lumbar herniated disc     11/7/22 pt reports herniated lumbar discs   • Lumbar radiculopathy     Right L1-2 and L2-3 Last assessed: 2/4/13   • Myocardial infarction [facility]) 2004   • Sciatica     Last assessed: 11/5/12- for bulging discs       PAST SURGICAL HISTORY:     Past Surgical History:   Procedure Laterality Date   • APPENDECTOMY      Resolved: 1974   • COLONOSCOPY     • CORONARY ANGIOPLASTY WITH STENT PLACEMENT      Resolved: 2004   • ROTATOR CUFF REPAIR Right    • SEPTOPLASTY      Resolved: 1994-for deviated septum surgery       CURRENT MEDICATIONS:     Current Facility-Administered Medications   Medication Dose Route Frequency Provider Last Rate Last Admin   • ceFAZolin (ANCEF) IVPB (premix in dextrose) 2,000 mg 50 mL  2,000 mg Intravenous Once Shantal Stack PA-C       • lactated ringers infusion  125 mL/hr Intravenous Continuous Sravan Krysten Godinez  mL/hr at 11/10/22 0915 125 mL/hr at 11/10/22 0915       ALLERGIES:     Allergies   Allergen Reactions   • Daypro [Oxaprozin] Anaphylaxis       SOCIAL HISTORY:     Social History     Socioeconomic History   • Marital status: /Civil Union     Spouse name: None   • Number of children: None   • Years of education: None   • Highest education level: None   Occupational History   • None   Tobacco Use   • Smoking status: Former Smoker     Packs/day: 0 50     Years: 15 00     Pack years: 7 50     Types: Cigarettes     Quit date:      Years since quittin 8   • Smokeless tobacco: Never Used   • Tobacco comment: Former smoker - quit @    Vaping Use   • Vaping Use: Never used   Substance and Sexual Activity   • Alcohol use: Yes     Comment: Social- wine (2-3 glasses on weekends )   • Drug use: No     Comment: Denies any drug use   • Sexual activity: Yes     Comment: Denies any chest pain or shortness of breath with activity   Other Topics Concern   • None   Social History Narrative   • None     Social Determinants of Health     Financial Resource Strain: Not on file   Food Insecurity: Not on file   Transportation Needs: Not on file   Physical Activity: Not on file   Stress: Not on file   Social Connections: Not on file   Intimate Partner Violence: Not on file   Housing Stability: Not on file       SOCIAL HISTORY:     Family History   Problem Relation Age of Onset   • Arthritis Mother    • Dementia Father    • Hypertension Father    • Prostate cancer Father    • Stroke Father         Syndrome   • Coronary artery disease Maternal Uncle    • Anesthesia problems Neg Hx        REVIEW OF SYSTEMS:     Review of Systems   Constitutional: Negative      Respiratory: Negative  Cardiovascular: Negative  Gastrointestinal: Negative for blood in stool and constipation  Genitourinary: Positive for difficulty urinating  Musculoskeletal: Negative  Skin: Negative  Psychiatric/Behavioral: Negative  PHYSICAL EXAM:     /76   Pulse 67   Temp 98 °F (36 7 °C) (Temporal)   Resp 16   Ht 5' 6" (1 676 m)   Wt 76 1 kg (167 lb 12 3 oz)   SpO2 99%   BMI 27 08 kg/m²     General:  Healthy appearing male in no acute distress  They have a normal affect  There is not appear to be any gross neurologic defects or abnormalities  HEENT:  Normocephalic, atraumatic  Neck is supple without any palpable lymphadenopathy  Cardiovascular:  Patient has normal palpable distal radial pulses  There is no significant peripheral edema  No JVD is noted  Respiratory:  Patient has unlabored respirations  There is no audible wheeze or rhonchi  Abdomen:  Abdomen is without surgical scars  Abdomen is soft and nontender  There is no tympany  Inguinal and umbilical hernia are not appreciated  Musculoskeletal:  Patient does not have significant CVA tenderness in the  flank with palpation or percussion  They full range of motion in all 4 extremities  Strength in all 4 extremities appears congruent  Patient is able to ambulate without assistance or difficulty  Dermatologic:  Patient has no skin abnormalities or rashes  LABS:     CBC:   Lab Results   Component Value Date    WBC 5 31 10/28/2022    HGB 13 1 10/28/2022    HCT 40 2 10/28/2022    MCV 96 10/28/2022     10/28/2022       BMP:   Lab Results   Component Value Date    CALCIUM 8 9 10/28/2022    K 3 9 10/28/2022    CO2 26 10/28/2022     10/28/2022    BUN 19 10/28/2022    CREATININE 0 87 10/28/2022       IMAGING:     No recent urologic imaging    ASSESSMENT:     79 y o  male with lower urinary tract symptoms    PLAN:     Patient now desires bipolar TURP   Procedure, risks and benefits discussed with patient and wife including risks of scar tissue formation (bladder neck contracture or stricture), incontinence and possible anejaculation  Informed consent signed

## 2022-11-10 NOTE — PLAN OF CARE
Problem: PAIN - ADULT  Goal: Verbalizes/displays adequate comfort level or baseline comfort level  Description: Interventions:  - Encourage patient to monitor pain and request assistance  - Assess pain using appropriate pain scale  - Administer analgesics based on type and severity of pain and evaluate response  - Implement non-pharmacological measures as appropriate and evaluate response  - Consider cultural and social influences on pain and pain management  - Notify physician/advanced practitioner if interventions unsuccessful or patient reports new pain  Outcome: Progressing     Problem: SAFETY ADULT  Goal: Patient will remain free of falls  Description: INTERVENTIONS:  - Educate patient/family on patient safety including physical limitations  - Instruct patient to call for assistance with activity   - Consult OT/PT to assist with strengthening/mobility   - Keep Call bell within reach  - Keep bed low and locked with side rails adjusted as appropriate  - Keep care items and personal belongings within reach  - Initiate and maintain comfort rounds  - Make Fall Risk Sign visible to staff  - Offer Toileting every 2 Hours, in advance of need  - Initiate/Maintain bed alarm  - Obtain necessary fall risk management equipment:   - Apply yellow socks and bracelet for high fall risk patients  - Consider moving patient to room near nurses station  Outcome: Progressing  Goal: Maintain or return to baseline ADL function  Description: INTERVENTIONS:  -  Assess patient's ability to carry out ADLs; assess patient's baseline for ADL function and identify physical deficits which impact ability to perform ADLs (bathing, care of mouth/teeth, toileting, grooming, dressing, etc )  - Assess/evaluate cause of self-care deficits   - Assess range of motion  - Assess patient's mobility; develop plan if impaired  - Assess patient's need for assistive devices and provide as appropriate  - Encourage maximum independence but intervene and supervise when necessary  - Involve family in performance of ADLs  - Assess for home care needs following discharge   - Consider OT consult to assist with ADL evaluation and planning for discharge  - Provide patient education as appropriate  Outcome: Progressing     Problem: DISCHARGE PLANNING  Goal: Discharge to home or other facility with appropriate resources  Description: INTERVENTIONS:  - Identify barriers to discharge w/patient and caregiver  - Arrange for needed discharge resources and transportation as appropriate  - Identify discharge learning needs (meds, wound care, etc )  - Arrange for interpretive services to assist at discharge as needed  - Refer to Case Management Department for coordinating discharge planning if the patient needs post-hospital services based on physician/advanced practitioner order or complex needs related to functional status, cognitive ability, or social support system  Outcome: Progressing     Problem: Knowledge Deficit  Goal: Patient/family/caregiver demonstrates understanding of disease process, treatment plan, medications, and discharge instructions  Description: Complete learning assessment and assess knowledge base    Interventions:  - Provide teaching at level of understanding  - Provide teaching via preferred learning methods  Outcome: Progressing     Problem: GENITOURINARY - ADULT  Goal: Absence of urinary retention  Description: INTERVENTIONS:  - Assess patient's ability to void and empty bladder  - Monitor I/O  - Bladder scan as needed  - Discuss with physician/AP medications to alleviate retention as needed  - Discuss catheterization for long term situations as appropriate  Outcome: Progressing  Goal: Urinary catheter remains patent  Description: INTERVENTIONS:  - Assess patency of urinary catheter  - If patient has a chronic nesbitt, consider changing catheter if non-functioning  - Follow guidelines for intermittent irrigation of non-functioning urinary catheter  Outcome: Progressing     Problem: SKIN/TISSUE INTEGRITY - ADULT  Goal: Skin Integrity remains intact(Skin Breakdown Prevention)  Description: Assess:  -Perform Bruno assessment every shift  -Clean and moisturize skin every 2 hours and PRN  -Inspect skin when repositioning, toileting, and assisting with ADLS  -Assess under medical devices   -Assess extremities for adequate circulation and sensation     Bed Management:  -Have minimal linens on bed & keep smooth, unwrinkled  -Change linens as needed when moist or perspiring  -Avoid sitting or lying in one position for more than 2 hours while in bed  -Keep HOB at 30 degrees     Toileting:  -Offer bedside commode  -Assess for incontinence every 2 hours and PRN  -Use incontinent care products after each incontinent episode     Activity:  -Mobilize patient 3 times a day  -Encourage activity and walks on unit  -Encourage or provide ROM exercises   -Turn and reposition patient every 2 Hours  -Use appropriate equipment to lift or move patient in bed  -Instruct/ Assist with weight shifting every 2 hours when out of bed in chair  -Consider limitation of chair time 2 hour intervals    Skin Care:  -Avoid use of baby powder, tape, friction and shearing, hot water or constrictive clothing  -Relieve pressure over bony prominences   -Do not massage red bony areas    Next Steps:  -Teach patient strategies to minimize risks   -Consider consults to  interdisciplinary teams   Outcome: Progressing  Goal: Incision(s), wounds(s) or drain site(s) healing without S/S of infection  Description: INTERVENTIONS  - Assess and document dressing, incision, wound bed, drain sites and surrounding tissue  - Provide patient and family education  - Perform skin care/dressing changes as ordered  Outcome: Progressing

## 2022-11-10 NOTE — ANESTHESIA PREPROCEDURE EVALUATION
Procedure:  (TURP) (N/A Abdomen)    Relevant Problems   CARDIO   (+) CAD S/P percutaneous coronary angioplasty   (+) Hyperlipidemia   (+) Hypertension      GI/HEPATIC   (+) Rectal bleeding      MUSCULOSKELETAL   (+) Disc degeneration, lumbar      Genitourinary   (+) Benign prostatic hyperplasia with weak urinary stream        Physical Exam    Airway    Mallampati score: II  TM Distance: >3 FB  Neck ROM: full     Dental   No notable dental hx upper dentures,     Cardiovascular  Rhythm: regular, Rate: normal, Cardiovascular exam normal    Pulmonary  Pulmonary exam normal Breath sounds clear to auscultation,     Other Findings        Anesthesia Plan  ASA Score- 3     Anesthesia Type- general with ASA Monitors  Additional Monitors:   Airway Plan: LMA  Comment: EKG is unchanged  Plan Factors-    Chart reviewed  EKG reviewed  Existing labs reviewed  Patient summary reviewed  Patient is not a current smoker  Patient instructed to abstain from smoking on day of procedure  Patient did not smoke on day of surgery  There is medical exclusion for perioperative obstructive sleep apnea risk education  Induction- intravenous  Postoperative Plan-     Informed Consent- Anesthetic plan and risks discussed with patient

## 2022-11-11 ENCOUNTER — TELEPHONE (OUTPATIENT)
Dept: OTHER | Facility: HOSPITAL | Age: 67
End: 2022-11-11

## 2022-11-11 VITALS
RESPIRATION RATE: 19 BRPM | HEART RATE: 66 BPM | OXYGEN SATURATION: 99 % | HEIGHT: 66 IN | WEIGHT: 167.77 LBS | BODY MASS INDEX: 26.96 KG/M2 | SYSTOLIC BLOOD PRESSURE: 108 MMHG | TEMPERATURE: 98.4 F | DIASTOLIC BLOOD PRESSURE: 61 MMHG

## 2022-11-11 LAB
ANION GAP SERPL CALCULATED.3IONS-SCNC: 6 MMOL/L (ref 4–13)
BUN SERPL-MCNC: 11 MG/DL (ref 5–25)
CALCIUM SERPL-MCNC: 8.2 MG/DL (ref 8.3–10.1)
CHLORIDE SERPL-SCNC: 105 MMOL/L (ref 96–108)
CO2 SERPL-SCNC: 28 MMOL/L (ref 21–32)
CREAT SERPL-MCNC: 0.76 MG/DL (ref 0.6–1.3)
ERYTHROCYTE [DISTWIDTH] IN BLOOD BY AUTOMATED COUNT: 13 % (ref 11.6–15.1)
GFR SERPL CREATININE-BSD FRML MDRD: 94 ML/MIN/1.73SQ M
GLUCOSE SERPL-MCNC: 97 MG/DL (ref 65–140)
HCT VFR BLD AUTO: 32.5 % (ref 36.5–49.3)
HGB BLD-MCNC: 10.7 G/DL (ref 12–17)
MCH RBC QN AUTO: 32 PG (ref 26.8–34.3)
MCHC RBC AUTO-ENTMCNC: 32.9 G/DL (ref 31.4–37.4)
MCV RBC AUTO: 97 FL (ref 82–98)
PLATELET # BLD AUTO: 223 THOUSANDS/UL (ref 149–390)
PMV BLD AUTO: 10 FL (ref 8.9–12.7)
POTASSIUM SERPL-SCNC: 3.6 MMOL/L (ref 3.5–5.3)
RBC # BLD AUTO: 3.34 MILLION/UL (ref 3.88–5.62)
SODIUM SERPL-SCNC: 139 MMOL/L (ref 135–147)
WBC # BLD AUTO: 11.54 THOUSAND/UL (ref 4.31–10.16)

## 2022-11-11 RX ORDER — OXYBUTYNIN CHLORIDE 5 MG/1
5 TABLET ORAL 3 TIMES DAILY PRN
Qty: 6 TABLET | Refills: 0 | Status: SHIPPED | OUTPATIENT
Start: 2022-11-11

## 2022-11-11 RX ORDER — DOCUSATE SODIUM 100 MG/1
100 CAPSULE, LIQUID FILLED ORAL 2 TIMES DAILY
Qty: 8 CAPSULE | Refills: 0 | Status: SHIPPED | OUTPATIENT
Start: 2022-11-11 | End: 2022-11-15

## 2022-11-11 RX ORDER — OXYCODONE HYDROCHLORIDE 5 MG/1
5 TABLET ORAL EVERY 4 HOURS PRN
Qty: 6 TABLET | Refills: 0 | Status: SHIPPED | OUTPATIENT
Start: 2022-11-11 | End: 2022-11-21

## 2022-11-11 RX ORDER — CEPHALEXIN 500 MG/1
500 CAPSULE ORAL 2 TIMES DAILY
Qty: 6 CAPSULE | Refills: 0 | Status: SHIPPED | OUTPATIENT
Start: 2022-11-11 | End: 2022-11-14

## 2022-11-11 RX ADMIN — CEFAZOLIN SODIUM 1000 MG: 1 SOLUTION INTRAVENOUS at 02:38

## 2022-11-11 RX ADMIN — SENNOSIDES 8.6 MG: 8.6 TABLET, FILM COATED ORAL at 08:19

## 2022-11-11 RX ADMIN — FINASTERIDE 5 MG: 5 TABLET, FILM COATED ORAL at 08:19

## 2022-11-11 RX ADMIN — PHENAZOPYRIDINE 200 MG: 100 TABLET ORAL at 08:19

## 2022-11-11 RX ADMIN — PHENAZOPYRIDINE 200 MG: 100 TABLET ORAL at 12:02

## 2022-11-11 RX ADMIN — OXYBUTYNIN CHLORIDE 5 MG: 5 TABLET, EXTENDED RELEASE ORAL at 08:19

## 2022-11-11 NOTE — PROGRESS NOTES
Progress Note - Urology  Malena [facility] 1955, 79 y o  male MRN: [id number]    Unit/Bed#: E5 -01 Encounter: [id number]    BPH  · POD # 1 TURP  · CBI clamped this am   Clear yellow urine  · Follow up in office next week for nesbitt removal   · DC home today     Subjective:   Patient is doing well this am   Urine has remained clear over night with no issues  Denies pain, is eating well  Cammy Skeens for discharge  Objective:  Nursing Rounds: Sangeetha KENT  Vitals: Blood pressure 108/61, pulse 66, temperature 98 4 °F (36 9 °C), resp  rate 19, height 5' 6" (1 676 m), weight 76 1 kg (167 lb 12 3 oz), SpO2 99 %  ,Body mass index is 27 08 kg/m²  Physical Exam  Vitals reviewed  Constitutional:       Appearance: Normal appearance  HENT:      Head: Normocephalic and atraumatic  Cardiovascular:      Rate and Rhythm: Normal rate  Pulmonary:      Effort: Pulmonary effort is normal       Breath sounds: Normal breath sounds  Abdominal:      General: Bowel sounds are normal  There is no distension  Palpations: Abdomen is soft  Genitourinary:     Penis: Normal        Comments: Nesbitt patient draining clear yellow urine  Musculoskeletal:         General: Normal range of motion  Cervical back: Normal range of motion  Skin:     General: Skin is warm and dry  Neurological:      Mental Status: He is alert and oriented to person, place, and time  Psychiatric:         Mood and Affect: Mood normal          Behavior: Behavior normal          Thought Content:  Thought content normal          Judgment: Judgment normal            Labs:  Recent Labs     11/11/22  0443   WBC 11 54*       Recent Labs     11/11/22  0443   HGB 10 7*     Recent Labs     11/11/22  0443   HCT 32 5*     Recent Labs     11/11/22  0443   CREATININE 0 76         History:    Past Medical History:   Diagnosis Date   • Acute myocardial infarction [facility])    • CAD (coronary artery disease)    • Coronary artery disease     11/7/22 Pt reports needed one stent in LAD   • H/O heart artery stent     22 Pt reports one stent placed in LAD in 2004   • Hyperlipidemia    • Hypertension    • Lumbar herniated disc     22 pt reports herniated lumbar discs   • Lumbar radiculopathy     Right L1-2 and L2-3 Last assessed: 13   • Myocardial infarction [facility])    • Sciatica     Last assessed: 12- for bulging discs     Social History     Socioeconomic History   • Marital status: /Civil Union     Spouse name: None   • Number of children: None   • Years of education: None   • Highest education level: None   Occupational History   • None   Tobacco Use   • Smoking status: Former Smoker     Packs/day: 0 50     Years: 15 00     Pack years: 7 50     Types: Cigarettes     Quit date:      Years since quittin 8   • Smokeless tobacco: Never Used   • Tobacco comment: Former smoker - quit @    Vaping Use   • Vaping Use: Never used   Substance and Sexual Activity   • Alcohol use: Yes     Comment: Social- wine (2-3 glasses on weekends )   • Drug use: No     Comment: Denies any drug use   • Sexual activity: Yes     Comment: Denies any chest pain or shortness of breath with activity   Other Topics Concern   • None   Social History Narrative   • None     Social Determinants of Health     Financial Resource Strain: Not on file   Food Insecurity: Not on file   Transportation Needs: Not on file   Physical Activity: Not on file   Stress: Not on file   Social Connections: Not on file   Intimate Partner Violence: Not on file   Housing Stability: Not on file     Past Surgical History:   Procedure Laterality Date   • APPENDECTOMY      Resolved:    • COLONOSCOPY     • CORONARY ANGIOPLASTY WITH STENT PLACEMENT      Resolved:    • IL TRANSURETHRAL ELEC-SURG PROSTATECTOM N/A 11/10/2022    Procedure: (TURP);   Surgeon: Marzena Arana MD;  Location: [facility];  Service: Urology   • ROTATOR CUFF REPAIR Right    • SEPTOPLASTY      Resolved: -for deviated septum surgery     Family History   Problem Relation Age of Onset   • Arthritis Mother    • Dementia Father    • Hypertension Father    • Prostate cancer Father    • Stroke Father         Syndrome   • Coronary artery disease Maternal Uncle    • Anesthesia problems Neg Hx        ESA Ortega  Date: 11/11/2022 Time: 9:54 AM

## 2022-11-11 NOTE — DISCHARGE INSTR - AVS FIRST PAGE
- You have a Carcamo bladder catheter which is draining urine and allowing your prostate to heal following surgery  - Please continue to care for this catheter as you have been instructed by the nurses at the hospital  - Is normal to see some blood in your urine   It is also normal to on occasion have urine draining around the tubing of the catheter  - Please stick with light activity for 2 weeks including no lawn mowing  - Please call for problems with her catheter or if the catheter were to stop draining, poorly controlled pain, fever greater than 101, or any other concerns that you feel warrants medical attention  - He will be scheduled for a follow-up appointment with the nurse for Carcamo catheter removal on Monday or Tuesday     Medications  Norco (pain, narcotic) do not drive if taking  Colace (stool softener)  Oxybutynin (bladder spasms) stop medication at least 24 hrs before office visit for catheter removal  Keflex (antibiotic)

## 2022-11-11 NOTE — NURSING NOTE
Discharge instructions reviewed with pt  He is aware of follow up appointments and prescriptions to be picked up  Carcamo care teaching done and demonstrated by pt, he verbalizes comfort with care  Carcamo intact and draining clear orange urine to leg bag  Pt has no questions or concerns, spouse is providing ride home

## 2022-11-11 NOTE — TELEPHONE ENCOUNTER
Can we please arrange for urethral Carcamo catheter removal in 5 days and follow-up advanced practitioner visit for AUA symptom score and postvoid residual post transurethral resection of the prostate in 4-6 weeks

## 2022-11-11 NOTE — TELEPHONE ENCOUNTER
Returned call to patients wife and scheduled patient for Carcamo catheter removal s/p TURP on 11/15 @ 0930 in the [facility]   Advised to start antibiotic the day prior to Carcamo catheter removal  Will schedule 4-6 week follow up with AP and nurse visit for catheter removal

## 2022-11-11 NOTE — TELEPHONE ENCOUNTER
Patient's wife called stating patient had surgery yesterday and patient is to have nesbitt catheter removed  Monday or Tuesday  Patient has to take keflex the day before and day of and the day after  They need to know if he needs to started on Sunday for Monday removal     Patient was just discharged at 3 pm   Orders were not put in by Urology until this afternoon       She can be reached at [phone]

## 2022-11-15 ENCOUNTER — PROCEDURE VISIT (OUTPATIENT)
Dept: UROLOGY | Facility: CLINIC | Age: 67
End: 2022-11-15

## 2022-11-15 VITALS
SYSTOLIC BLOOD PRESSURE: 130 MMHG | DIASTOLIC BLOOD PRESSURE: 70 MMHG | HEART RATE: 72 BPM | RESPIRATION RATE: 20 BRPM | HEIGHT: 66 IN | BODY MASS INDEX: 26.52 KG/M2 | WEIGHT: 165 LBS

## 2022-11-15 DIAGNOSIS — N40.1 BENIGN PROSTATIC HYPERPLASIA WITH LOWER URINARY TRACT SYMPTOMS, SYMPTOM DETAILS UNSPECIFIED: Primary | ICD-10-CM

## 2022-11-15 NOTE — PROGRESS NOTES
11/15/2022    Quirino Zhang is a 79 y o  male  [id number]    Diagnosis:  Chief Complaint     Benign Prostatic Hypertrophy          Patient presents for nesbitt removal s/p TURP on 11/10/2022 with Dr Rivas Stands:  Follow up in 4-6 weeks for post visit  Procedure: Nesbitt removed after deflation of intact balloon without incident  Patient tolerated procedure well      Vitals:    11/15/22 0951   BP: 130/70   Pulse: 72   Resp: 20   Weight: 74 8 kg (165 lb)   Height: 5' 6" (1 676 m)         Leslie Keller RN

## 2022-11-30 ENCOUNTER — TELEPHONE (OUTPATIENT)
Dept: OTHER | Facility: OTHER | Age: 67
End: 2022-11-30

## 2022-11-30 DIAGNOSIS — N40.1 BENIGN PROSTATIC HYPERPLASIA WITH URINARY OBSTRUCTION: Primary | ICD-10-CM

## 2022-11-30 DIAGNOSIS — N13.8 BENIGN PROSTATIC HYPERPLASIA WITH URINARY OBSTRUCTION: Primary | ICD-10-CM

## 2022-11-30 NOTE — TELEPHONE ENCOUNTER
Pt wife called and left VM stating pt would like to go back to work Monday with restrictions    Pt call [phone] Daniel Pal (wife)

## 2022-11-30 NOTE — LETTER
December 1, 2022     Patient: Krzysztof Levy  YOB: 1955  Date of Visit: 11/30/2022      To Whom it May Concern:    Erlin Renteria is under my professional care  Kaleb Steiner was seen in my office on 11/30/2022  Kaleb Steiner may return to work with limitations to include desk work only as of Monday 12/5/22  Full duty release date to be determined after pending followups later this month  If you have any questions or concerns, please don't hesitate to call           Sincerely,     Brayan Fischer PA-C      CC: No Recipients

## 2022-11-30 NOTE — TELEPHONE ENCOUNTER
TURP performed 11/11  Can return Monday if desk work  Wait until visit 12/9 if anything more strenuous prior to clearance

## 2022-11-30 NOTE — TELEPHONE ENCOUNTER
Called and infomred pt he stated he would like to stop in office to  a back to work note stating he can do light duty   Please have this note ready for pt tomorrow 12/1/22

## 2022-12-01 NOTE — TELEPHONE ENCOUNTER
Pt stopped in office  Looks and feels very well  I gave him written work note to return desk duty on Monday 12/5  See ZP 12/9 for postop visit   Full release date TBD after that visit/timeframe

## 2022-12-05 ENCOUNTER — APPOINTMENT (OUTPATIENT)
Dept: LAB | Facility: CLINIC | Age: 67
End: 2022-12-05

## 2022-12-05 DIAGNOSIS — N13.8 BENIGN PROSTATIC HYPERPLASIA WITH URINARY OBSTRUCTION: ICD-10-CM

## 2022-12-05 DIAGNOSIS — N40.1 BENIGN PROSTATIC HYPERPLASIA WITH URINARY OBSTRUCTION: ICD-10-CM

## 2022-12-05 LAB
BACTERIA UR QL AUTO: ABNORMAL /HPF
BILIRUB UR QL STRIP: NEGATIVE
BUDDING YEAST: PRESENT
CLARITY UR: ABNORMAL
COLOR UR: ABNORMAL
GLUCOSE UR STRIP-MCNC: NEGATIVE MG/DL
HGB UR QL STRIP.AUTO: ABNORMAL
KETONES UR STRIP-MCNC: NEGATIVE MG/DL
LEUKOCYTE ESTERASE UR QL STRIP: ABNORMAL
MUCOUS THREADS UR QL AUTO: ABNORMAL
NITRITE UR QL STRIP: NEGATIVE
NON-SQ EPI CELLS URNS QL MICRO: ABNORMAL /HPF
PH UR STRIP.AUTO: 6 [PH]
PROT UR STRIP-MCNC: ABNORMAL MG/DL
RBC #/AREA URNS AUTO: ABNORMAL /HPF
SP GR UR STRIP.AUTO: 1.02 (ref 1–1.03)
UROBILINOGEN UR STRIP-ACNC: <2 MG/DL
WBC #/AREA URNS AUTO: ABNORMAL /HPF

## 2022-12-06 LAB — BACTERIA UR CULT: NORMAL

## 2022-12-08 NOTE — PROGRESS NOTES
UROLOGY FOLLOW-UP NOTE     CHIEF COMPLAINT   Shirley Andres is a 79 y o  male with a complaint of   Chief Complaint   Patient presents with   • Benign Prostatic Hypertrophy       History of Present Illness:     79 y o  male with lower urinary tract symptoms  Seen by the advanced practitioner team   Patient has a weak and hesitant urinary stream sometimes having to sit to start  He often has some issues with straining  Is following with colorectal surgery given hemorrhoids and blood in his stool, visit upcoming on the 15th of this month  Patient currently managed on Flomax single-dose a dinner and has been offered Cialis 5 mg daily which he has not taking at current  Patient reports that if he takes the Cialis 5 mg daily, he has some occasional chest pain  If he takes it intermittently, he has no side effects  Patient ultimately underwent transurethral resection of his prostate on November 10  Pathology returned low risk [place] 6 incidental prostate cancer  Presents today for postoperative follow-up  Feels well  No longer on any medications      Lab Results   Component Value Date    PSA 1 6 10/28/2022    PSA 1 53 03/05/2022     Past Medical History:     Past Medical History:   Diagnosis Date   • Acute myocardial infarction [facility])    • Benign prostatic hyperplasia    • CAD (coronary artery disease)    • Coronary artery disease     11/7/22 Pt reports needed one stent in LAD   • H/O heart artery stent     11/7/22 Pt reports one stent placed in LAD in 6/2004   • Hyperlipidemia    • Hypertension    • Lumbar herniated disc     11/7/22 pt reports herniated lumbar discs   • Lumbar radiculopathy     Right L1-2 and L2-3 Last assessed: 2/4/13   • Myocardial infarction [facility]) 2004   • Sciatica     Last assessed: 11/5/12- for bulging discs       PAST SURGICAL HISTORY:     Past Surgical History:   Procedure Laterality Date   • APPENDECTOMY      Resolved: 1974   • COLONOSCOPY     • CORONARY ANGIOPLASTY WITH STENT PLACEMENT      Resolved:    • CO TRANSURETHRAL ELEC-SURG PROSTATECTOM N/A 11/10/2022    Procedure: (TURP); Surgeon: Mercedez Hooper MD;  Location: [facility];  Service: Urology   • ROTATOR CUFF REPAIR Right    • SEPTOPLASTY      Resolved: -for deviated septum surgery       CURRENT MEDICATIONS:     Current Outpatient Medications   Medication Sig Dispense Refill   • CO-ENZYME Q10 PO Take by mouth in the morning     • lisinopril (ZESTRIL) 20 mg tablet TAKE ONE TABLET BY MOUTH EVERY DAY 90 tablet 3   • Multiple Vitamins-Minerals (MULTIVITAL-M) TABS Take 1 tablet by mouth daily     • aspirin 81 MG tablet Take 81 mg by mouth daily 22 Pt reports last dose on 22 per surgeon instructions (Patient not taking: Reported on 11/15/2022)     • docusate sodium (COLACE) 100 mg capsule Take 1 capsule (100 mg total) by mouth 2 (two) times a day for 4 days 8 capsule 0   • simvastatin (ZOCOR) 80 mg tablet TAKE ONE TABLET BY MOUTH EVERY DAY (Patient not taking: Reported on 11/15/2022) 90 tablet 0     No current facility-administered medications for this visit         ALLERGIES:     Allergies   Allergen Reactions   • Daypro [Oxaprozin] Anaphylaxis       SOCIAL HISTORY:     Social History     Socioeconomic History   • Marital status: /Civil Union     Spouse name: None   • Number of children: None   • Years of education: None   • Highest education level: None   Occupational History   • None   Tobacco Use   • Smoking status: Former     Packs/day: 0 50     Years: 15 00     Pack years: 7 50     Types: Cigarettes     Quit date:      Years since quittin 9   • Smokeless tobacco: Never   • Tobacco comments:     Former smoker - quit @    Vaping Use   • Vaping Use: Never used   Substance and Sexual Activity   • Alcohol use: Yes     Comment: Social- wine (2-3 glasses on weekends )   • Drug use: No     Comment: Denies any drug use   • Sexual activity: Yes     Comment: Denies any chest pain or shortness of breath with activity   Other Topics Concern   • None   Social History Narrative   • None     Social Determinants of Health     Financial Resource Strain: Not on file   Food Insecurity: Not on file   Transportation Needs: Not on file   Physical Activity: Not on file   Stress: Not on file   Social Connections: Not on file   Intimate Partner Violence: Not on file   Housing Stability: Not on file       SOCIAL HISTORY:     Family History   Problem Relation Age of Onset   • Arthritis Mother    • Dementia Father    • Hypertension Father    • Prostate cancer Father    • Stroke Father         Syndrome   • Coronary artery disease Maternal Uncle    • Anesthesia problems Neg Hx        REVIEW OF SYSTEMS:     Review of Systems   Constitutional: Negative  Respiratory: Negative  Cardiovascular: Negative  Gastrointestinal: Positive for blood in stool  Negative for constipation  Genitourinary: Negative for difficulty urinating, dysuria, frequency and urgency  Musculoskeletal: Negative  Skin: Negative  Psychiatric/Behavioral: Negative  PHYSICAL EXAM:     /82 (BP Location: Right arm, Patient Position: Sitting, Cuff Size: Standard)   Pulse 84   Resp 18   Ht 5' 6" (1 676 m)   Wt 75 8 kg (167 lb)   SpO2 97%   BMI 26 95 kg/m²     General:  Healthy appearing male in no acute distress  They have a normal affect  There is not appear to be any gross neurologic defects or abnormalities  HEENT:  Normocephalic, atraumatic  Neck is supple without any palpable lymphadenopathy  Cardiovascular:  Patient has normal palpable distal radial pulses  There is no significant peripheral edema  No JVD is noted  Respiratory:  Patient has unlabored respirations  There is no audible wheeze or rhonchi  Abdomen:  Abdomen is without surgical scars  Abdomen is soft and nontender  There is no tympany  Inguinal and umbilical hernia are not appreciated    Musculoskeletal:  Patient does not have significant CVA tenderness in the  flank with palpation or percussion  They full range of motion in all 4 extremities  Strength in all 4 extremities appears congruent  Patient is able to ambulate without assistance or difficulty  Dermatologic:  Patient has no skin abnormalities or rashes  LABS:     CBC:   Lab Results   Component Value Date    WBC 11 54 (H) 11/11/2022    HGB 10 7 (L) 11/11/2022    HCT 32 5 (L) 11/11/2022    MCV 97 11/11/2022     11/11/2022       BMP:   Lab Results   Component Value Date    CALCIUM 8 2 (L) 11/11/2022    K 3 6 11/11/2022    CO2 28 11/11/2022     11/11/2022    BUN 11 11/11/2022    CREATININE 0 76 11/11/2022       PATHOLOGY:     11/10/22  Final Diagnosis   A  Prostate chips, transurethral resection:  -   Incidental prostatic adenocarcinoma, [place] score 3 + 3 = 6 (grade group 1), involving 2 fragments and less than 1% of submitted tissue (see comment)  -   No intraductal carcinoma, no extraprostatic extension and no perineural invasion identified    -   Background partially cauterize prostatic tissue with benign prostatic hyperplasia, acute and chronic inflammation     -   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  PROCEDURE:     Recent Results (from the past 2 hour(s))   POCT Measure PVR    Collection Time: 12/09/22  9:11 AM   Result Value Ref Range    POST-VOID RESIDUAL VOLUME, ML POC 23 mL        ASSESSMENT:     79 y o  male s/p TURP with incidental [place] 6 low risk cancer on pathology    PLAN:     I reviewed the findings of the pathology with the patient  Despite low PSA and prior negative rectal exams, incidental cancer found  I would recommend a plan for early multi parametric MRI testing approximately 3 months after surgery to assess for occult abnormalities  PSA can be performed at that time as well  This will be part of the patient surveillance protocol      From a voiding standpoint, the patient is doing beautifully after transurethral resection  He is off all medication  We will continue to follow

## 2022-12-09 ENCOUNTER — TELEPHONE (OUTPATIENT)
Dept: UROLOGY | Facility: CLINIC | Age: 67
End: 2022-12-09

## 2022-12-09 ENCOUNTER — OFFICE VISIT (OUTPATIENT)
Dept: UROLOGY | Facility: CLINIC | Age: 67
End: 2022-12-09

## 2022-12-09 VITALS
RESPIRATION RATE: 18 BRPM | DIASTOLIC BLOOD PRESSURE: 82 MMHG | HEIGHT: 66 IN | HEART RATE: 84 BPM | BODY MASS INDEX: 26.84 KG/M2 | SYSTOLIC BLOOD PRESSURE: 120 MMHG | WEIGHT: 167 LBS | OXYGEN SATURATION: 97 %

## 2022-12-09 DIAGNOSIS — C61 PROSTATE CANCER (HCC): Primary | ICD-10-CM

## 2022-12-09 LAB — POST-VOID RESIDUAL VOLUME, ML POC: 23 ML

## 2023-01-27 LAB
BUN SERPL-MCNC: 19 MG/DL (ref 7–25)
BUN/CREAT SERPL: NORMAL (CALC) (ref 6–22)
CALCIUM SERPL-MCNC: 9.5 MG/DL (ref 8.6–10.3)
CHLORIDE SERPL-SCNC: 105 MMOL/L (ref 98–110)
CO2 SERPL-SCNC: 27 MMOL/L (ref 20–32)
CREAT SERPL-MCNC: 0.91 MG/DL (ref 0.7–1.35)
GFR/BSA.PRED SERPLBLD CYS-BASED-ARV: 92 ML/MIN/1.73M2
GLUCOSE SERPL-MCNC: 96 MG/DL (ref 65–99)
POTASSIUM SERPL-SCNC: 4.2 MMOL/L (ref 3.5–5.3)
PSA SERPL-MCNC: 0.6 NG/ML
SODIUM SERPL-SCNC: 138 MMOL/L (ref 135–146)

## 2023-02-10 ENCOUNTER — HOSPITAL ENCOUNTER (OUTPATIENT)
Dept: RADIOLOGY | Age: 68
Discharge: HOME | End: 2023-02-10

## 2023-02-10 DIAGNOSIS — C61 PROSTATE CANCER (HCC): ICD-10-CM

## 2023-02-10 RX ADMIN — GADOBUTROL 8 ML: 604.72 INJECTION INTRAVENOUS at 08:53

## 2023-02-17 ENCOUNTER — OFFICE VISIT (OUTPATIENT)
Dept: UROLOGY | Facility: CLINIC | Age: 68
End: 2023-02-17

## 2023-02-17 VITALS
HEIGHT: 66 IN | HEART RATE: 64 BPM | DIASTOLIC BLOOD PRESSURE: 80 MMHG | SYSTOLIC BLOOD PRESSURE: 120 MMHG | BODY MASS INDEX: 27 KG/M2 | WEIGHT: 168 LBS

## 2023-02-17 DIAGNOSIS — C61 PROSTATE CANCER (HCC): Primary | ICD-10-CM

## 2023-02-17 DIAGNOSIS — N40.1 BENIGN PROSTATIC HYPERPLASIA WITH LOWER URINARY TRACT SYMPTOMS, SYMPTOM DETAILS UNSPECIFIED: ICD-10-CM

## 2023-02-17 NOTE — PROGRESS NOTES
UROLOGY FOLLOW-UP NOTE     CHIEF COMPLAINT   Baljit Erazo is a 79 y o  male with a complaint of   No chief complaint on file  History of Present Illness:     79 y o  male with lower urinary tract symptoms  Seen by the advanced practitioner team   Patient has a weak and hesitant urinary stream sometimes having to sit to start  He often has some issues with straining  Is following with colorectal surgery given hemorrhoids and blood in his stool, visit upcoming on the 15th of this month  Patient currently managed on Flomax single-dose a dinner and has been offered Cialis 5 mg daily which he has not taking at current  Patient reports that if he takes the Cialis 5 mg daily, he has some occasional chest pain  If he takes it intermittently, he has no side effects  Patient ultimately underwent transurethral resection of his prostate on November 10, 2022  Pathology returned low risk [place] 6 incidental prostate cancer  Presents today for postoperative follow-up  Feels well  No longer on any medications  mpMRI performed 2/10/23  Results not yet available day of visit 2/17/23      Lab Results   Component Value Date    PSA 0 60 01/27/2023    PSA 1 6 10/28/2022    PSA 1 53 03/05/2022     Past Medical History:     Past Medical History:   Diagnosis Date   • Acute myocardial infarction [facility])    • Benign prostatic hyperplasia    • CAD (coronary artery disease)    • Coronary artery disease     11/7/22 Pt reports needed one stent in LAD   • H/O heart artery stent     11/7/22 Pt reports one stent placed in LAD in 6/2004   • Hyperlipidemia    • Hypertension    • Lumbar herniated disc     11/7/22 pt reports herniated lumbar discs   • Lumbar radiculopathy     Right L1-2 and L2-3 Last assessed: 2/4/13   • Myocardial infarction [facility]) 2004   • Sciatica     Last assessed: 11/5/12- for bulging discs       PAST SURGICAL HISTORY:     Past Surgical History:   Procedure Laterality Date   • APPENDECTOMY      Resolved: 1974 • COLONOSCOPY     • CORONARY ANGIOPLASTY WITH STENT PLACEMENT      Resolved:    • [place] TRURL ELECTROSURG RESCJ PROSTATE BLEED COMPLETE N/A 11/10/2022    Procedure: (TURP); Surgeon: Lolis Garcia MD;  Location: [facility];  Service: Urology   • ROTATOR CUFF REPAIR Right    • SEPTOPLASTY      Resolved: -for deviated septum surgery       CURRENT MEDICATIONS:     Current Outpatient Medications   Medication Sig Dispense Refill   • aspirin 81 MG tablet Take 81 mg by mouth daily 22 Pt reports last dose on 22 per surgeon instructions (Patient not taking: Reported on 11/15/2022)     • CO-ENZYME Q10 PO Take by mouth in the morning     • docusate sodium (COLACE) 100 mg capsule Take 1 capsule (100 mg total) by mouth 2 (two) times a day for 4 days 8 capsule 0   • lisinopril (ZESTRIL) 20 mg tablet TAKE ONE TABLET BY MOUTH EVERY DAY 90 tablet 3   • Multiple Vitamins-Minerals (MULTIVITAL-M) TABS Take 1 tablet by mouth daily     • simvastatin (ZOCOR) 80 mg tablet TAKE ONE TABLET BY MOUTH EVERY DAY (Patient not taking: Reported on 11/15/2022) 90 tablet 0     No current facility-administered medications for this visit  ALLERGIES:     Allergies   Allergen Reactions   • Daypro [Oxaprozin] Anaphylaxis       SOCIAL HISTORY:     Social History     Socioeconomic History   • Marital status: /Civil Union     Spouse name: Not on file   • Number of children: Not on file   • Years of education: Not on file   • Highest education level: Not on file   Occupational History   • Not on file   Tobacco Use   • Smoking status: Former     Packs/day: 0 50     Years: 15 00     Pack years: 7 50     Types: Cigarettes     Quit date:      Years since quittin 1   • Smokeless tobacco: Never   • Tobacco comments:     Former smoker - quit @    Vaping Use   • Vaping Use: Never used   Substance and Sexual Activity   • Alcohol use: Yes     Comment: Social- wine (2-3 glasses on weekends )   • Drug use:  No Comment: Denies any drug use   • Sexual activity: Yes     Comment: Denies any chest pain or shortness of breath with activity   Other Topics Concern   • Not on file   Social History Narrative   • Not on file     Social Determinants of Health     Financial Resource Strain: Not on file   Food Insecurity: Not on file   Transportation Needs: Not on file   Physical Activity: Not on file   Stress: Not on file   Social Connections: Not on file   Intimate Partner Violence: Not on file   Housing Stability: Not on file       SOCIAL HISTORY:     Family History   Problem Relation Age of Onset   • Arthritis Mother    • Dementia Father    • Hypertension Father    • Prostate cancer Father    • Stroke Father         Syndrome   • Coronary artery disease Maternal Uncle    • Anesthesia problems Neg Hx        REVIEW OF SYSTEMS:     Review of Systems   Constitutional: Negative  Respiratory: Negative  Cardiovascular: Negative  Gastrointestinal: Positive for blood in stool  Negative for constipation  Genitourinary: Negative for difficulty urinating, dysuria, frequency and urgency  Musculoskeletal: Negative  Skin: Negative  Psychiatric/Behavioral: Negative  PHYSICAL EXAM:     There were no vitals taken for this visit  General:  Healthy appearing male in no acute distress  They have a normal affect  There is not appear to be any gross neurologic defects or abnormalities  HEENT:  Normocephalic, atraumatic  Neck is supple without any palpable lymphadenopathy  Cardiovascular:  Patient has normal palpable distal radial pulses  There is no significant peripheral edema  No JVD is noted  Respiratory:  Patient has unlabored respirations  There is no audible wheeze or rhonchi  Abdomen:  Abdomen is without surgical scars  Abdomen is soft and nontender  There is no tympany  Inguinal and umbilical hernia are not appreciated    Musculoskeletal:  Patient does not have significant CVA tenderness in the  flank with palpation or percussion  They full range of motion in all 4 extremities  Strength in all 4 extremities appears congruent  Patient is able to ambulate without assistance or difficulty  Dermatologic:  Patient has no skin abnormalities or rashes  LABS:     CBC:   Lab Results   Component Value Date    WBC 11 54 (H) 11/11/2022    HGB 10 7 (L) 11/11/2022    HCT 32 5 (L) 11/11/2022    MCV 97 11/11/2022     11/11/2022       BMP:   Lab Results   Component Value Date    CALCIUM 9 5 01/27/2023    K 4 2 01/27/2023    CO2 27 01/27/2023     01/27/2023    BUN 19 01/27/2023    CREATININE 0 91 01/27/2023       PATHOLOGY:     11/10/22  Final Diagnosis   A  Prostate chips, transurethral resection:  -   Incidental prostatic adenocarcinoma, [place] score 3 + 3 = 6 (grade group 1), involving 2 fragments and less than 1% of submitted tissue (see comment)  -   No intraductal carcinoma, no extraprostatic extension and no perineural invasion identified    -   Background partially cauterize prostatic tissue with benign prostatic hyperplasia, acute and chronic inflammation     -   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

## 2023-05-28 PROBLEM — C61 PROSTATE CA (HCC): Status: ACTIVE | Noted: 2023-05-28

## 2023-05-28 PROBLEM — Z01.818 PREOP GENERAL PHYSICAL EXAM: Status: RESOLVED | Noted: 2021-04-29 | Resolved: 2023-05-28

## 2023-05-31 ENCOUNTER — OFFICE VISIT (OUTPATIENT)
Dept: FAMILY MEDICINE CLINIC | Facility: CLINIC | Age: 68
End: 2023-05-31

## 2023-05-31 VITALS
SYSTOLIC BLOOD PRESSURE: 126 MMHG | TEMPERATURE: 98.4 F | HEIGHT: 66 IN | DIASTOLIC BLOOD PRESSURE: 72 MMHG | OXYGEN SATURATION: 98 % | WEIGHT: 160.6 LBS | RESPIRATION RATE: 18 BRPM | HEART RATE: 76 BPM | BODY MASS INDEX: 25.81 KG/M2

## 2023-05-31 DIAGNOSIS — Z98.61 CAD S/P PERCUTANEOUS CORONARY ANGIOPLASTY: ICD-10-CM

## 2023-05-31 DIAGNOSIS — E78.2 MIXED HYPERLIPIDEMIA: ICD-10-CM

## 2023-05-31 DIAGNOSIS — Z23 ENCOUNTER FOR IMMUNIZATION: ICD-10-CM

## 2023-05-31 DIAGNOSIS — I25.10 CAD S/P PERCUTANEOUS CORONARY ANGIOPLASTY: ICD-10-CM

## 2023-05-31 DIAGNOSIS — C61 PROSTATE CA (HCC): ICD-10-CM

## 2023-05-31 DIAGNOSIS — R63.4 UNINTENTIONAL WEIGHT LOSS: Primary | ICD-10-CM

## 2023-05-31 DIAGNOSIS — I10 PRIMARY HYPERTENSION: ICD-10-CM

## 2023-05-31 NOTE — PROGRESS NOTES
[facility] PRACTICE    NAME: Joan Baig  AGE: 79 y o  SEX: male  : 1955     DATE: 2023     Assessment and Plan:     Problem List Items Addressed This Visit        Cardiovascular and Mediastinum    CAD S/P percutaneous coronary angioplasty    Hypertension     Well controlled  Blood pressure today is 126/72  Continue lisinopril 20 mg daily            Genitourinary    Prostate CA [facility])     Follows with urology  Recent MRI of the prostate in 2023  Due for PSA            Other    Hyperlipidemia     Continue simvastatin  Will check lipid panel          Relevant Orders    Lipid panel    Unintentional weight loss - Primary     The patient with a 27 pound unintentional weight loss since last   He was diagnosed with prostate cancer in 2022  MRI of the prostate performed in 2023 and was normal   Occasional nausea, no vomiting  Denies blood in his stool  Also with decreased appetite  Blood work ordered to rule out any metabolic reason for weight loss  If normal, might proceed with CT of the abdomen and pelvis  Relevant Orders    HEMOGLOBIN A1C W/ EAG ESTIMATION    CBC and differential    Comprehensive metabolic panel    TSH, 3rd generation with Free T4 reflex   Other Visit Diagnoses     Encounter for immunization                  No follow-ups on file  Chief Complaint:     Chief Complaint   Patient presents with   • Weight Loss     In the recent year        History of Present Illness:     Lost 27 pounds in one year uhintentionally, occasional nausea  No vomiting, no blood in stool  Due in July for surveillance colonoscopy  Decreased appetite  Blood work ordered  Review of Systems:     Review of Systems   Constitutional: Positive for appetite change (decreased) and unexpected weight change (lost 27 pounds in one year)  Negative for fatigue  HENT: Negative    Negative for congestion, postnasal drip, rhinorrhea "and trouble swallowing  Eyes: Negative  Negative for visual disturbance  Respiratory: Negative  Negative for choking and shortness of breath  Cardiovascular: Negative  Negative for chest pain  Gastrointestinal: Negative  Endocrine: Negative  Genitourinary: Negative  Musculoskeletal: Negative  Negative for arthralgias, back pain, myalgias and neck pain  Skin: Negative  Neurological: Negative for dizziness and headaches  Psychiatric/Behavioral: Negative  Problem List:     Patient Active Problem List   Diagnosis   • CAD S/P percutaneous coronary angioplasty   • Disc degeneration, lumbar   • Hyperlipidemia   • Hypertension   • Lyme disease   • Mild obesity   • Well adult exam   • Traumatic tear of right rotator cuff   • Voiding dysfunction   • Benign prostatic hyperplasia with weak urinary stream   • Rectal bleeding   • Prolapsed hemorrhoids   • Prostate CA ([address] )   • Unintentional weight loss        Objective:     /72   Pulse 76   Temp 98 4 °F (36 9 °C) (Tympanic)   Resp 18   Ht 5' 6\" (1 676 m)   Wt 72 8 kg (160 lb 9 6 oz)   SpO2 98%   BMI 25 92 kg/m²     Current Outpatient Medications   Medication Sig Dispense Refill   • CO-ENZYME Q10 PO Take by mouth in the morning     • lisinopril (ZESTRIL) 20 mg tablet TAKE ONE TABLET BY MOUTH EVERY DAY 90 tablet 3   • Multiple Vitamins-Minerals (MULTIVITAL-M) TABS Take 1 tablet by mouth daily     • simvastatin (ZOCOR) 80 mg tablet TAKE ONE TABLET BY MOUTH EVERY DAY 90 tablet 0   • aspirin 81 MG tablet Take 81 mg by mouth daily 11/7/22 Pt reports last dose on 11/6/22 per surgeon instructions (Patient not taking: Reported on 11/15/2022)     • docusate sodium (COLACE) 100 mg capsule Take 1 capsule (100 mg total) by mouth 2 (two) times a day for 4 days 8 capsule 0     No current facility-administered medications for this visit  Physical Exam  Vitals reviewed  Constitutional:       Appearance: Normal appearance     HENT:      " Head: Normocephalic and atraumatic  Nose: Nose normal       Mouth/Throat:      Mouth: Mucous membranes are moist    Eyes:      Extraocular Movements: Extraocular movements intact  Pupils: Pupils are equal, round, and reactive to light  Cardiovascular:      Rate and Rhythm: Normal rate and regular rhythm  Pulses: Normal pulses  Heart sounds: Normal heart sounds  Pulmonary:      Effort: Pulmonary effort is normal       Breath sounds: Normal breath sounds  Abdominal:      General: Bowel sounds are normal  There is no distension  Palpations: Abdomen is soft  There is no mass  Tenderness: There is no abdominal tenderness  There is no guarding or rebound  Hernia: No hernia is present  Musculoskeletal:         General: Normal range of motion  Skin:     General: Skin is warm  Neurological:      General: No focal deficit present  Mental Status: He is alert and oriented to person, place, and time  Mental status is at baseline  Psychiatric:         Mood and Affect: Mood normal          Behavior: Behavior normal          Thought Content:  Thought content normal          Judgment: Judgment normal          Ian Godinez, [id number] [address]

## 2023-05-31 NOTE — ASSESSMENT & PLAN NOTE
The patient with a 27 pound unintentional weight loss since last June  He was diagnosed with prostate cancer in November of 2022  MRI of the prostate performed in February of 2023 and was normal   Occasional nausea, no vomiting  Denies blood in his stool  Also with decreased appetite  Blood work ordered to rule out any metabolic reason for weight loss  If normal, might proceed with CT of the abdomen and pelvis  chemotherapy

## 2023-06-03 LAB
ALBUMIN SERPL-MCNC: 4.2 G/DL (ref 3.6–5.1)
ALBUMIN/GLOB SERPL: 1.6 (CALC) (ref 1–2.5)
ALP SERPL-CCNC: 98 U/L (ref 35–144)
ALT SERPL-CCNC: 19 U/L (ref 9–46)
AST SERPL-CCNC: 20 U/L (ref 10–35)
BASOPHILS # BLD AUTO: 42 CELLS/UL (ref 0–200)
BASOPHILS NFR BLD AUTO: 0.8 %
BILIRUB SERPL-MCNC: 0.4 MG/DL (ref 0.2–1.2)
BUN SERPL-MCNC: 18 MG/DL (ref 7–25)
BUN/CREAT SERPL: NORMAL (CALC) (ref 6–22)
CALCIUM SERPL-MCNC: 9.6 MG/DL (ref 8.6–10.3)
CHLORIDE SERPL-SCNC: 103 MMOL/L (ref 98–110)
CHOLEST SERPL-MCNC: 202 MG/DL
CHOLEST/HDLC SERPL: 3.3 (CALC)
CO2 SERPL-SCNC: 28 MMOL/L (ref 20–32)
CREAT SERPL-MCNC: 0.88 MG/DL (ref 0.7–1.35)
EOSINOPHIL # BLD AUTO: 201 CELLS/UL (ref 15–500)
EOSINOPHIL NFR BLD AUTO: 3.8 %
ERYTHROCYTE [DISTWIDTH] IN BLOOD BY AUTOMATED COUNT: 12.2 % (ref 11–15)
EST. AVERAGE GLUCOSE BLD GHB EST-MCNC: 97 MG/DL
EST. AVERAGE GLUCOSE BLD GHB EST-SCNC: 5.4 MMOL/L
GFR/BSA.PRED SERPLBLD CYS-BASED-ARV: 94 ML/MIN/1.73M2
GLOBULIN SER CALC-MCNC: 2.7 G/DL (CALC) (ref 1.9–3.7)
GLUCOSE SERPL-MCNC: 89 MG/DL (ref 65–99)
HBA1C MFR BLD: 5 % OF TOTAL HGB
HCT VFR BLD AUTO: 42 % (ref 38.5–50)
HDLC SERPL-MCNC: 62 MG/DL
HGB BLD-MCNC: 14.3 G/DL (ref 13.2–17.1)
LDLC SERPL CALC-MCNC: 124 MG/DL (CALC)
LYMPHOCYTES # BLD AUTO: 1622 CELLS/UL (ref 850–3900)
LYMPHOCYTES NFR BLD AUTO: 30.6 %
MCH RBC QN AUTO: 32.7 PG (ref 27–33)
MCHC RBC AUTO-ENTMCNC: 34 G/DL (ref 32–36)
MCV RBC AUTO: 96.1 FL (ref 80–100)
MONOCYTES # BLD AUTO: 572 CELLS/UL (ref 200–950)
MONOCYTES NFR BLD AUTO: 10.8 %
NEUTROPHILS # BLD AUTO: 2862 CELLS/UL (ref 1500–7800)
NEUTROPHILS NFR BLD AUTO: 54 %
NONHDLC SERPL-MCNC: 140 MG/DL (CALC)
PLATELET # BLD AUTO: 285 THOUSAND/UL (ref 140–400)
PMV BLD REES-ECKER: 10 FL (ref 7.5–12.5)
POTASSIUM SERPL-SCNC: 4.4 MMOL/L (ref 3.5–5.3)
PROT SERPL-MCNC: 6.9 G/DL (ref 6.1–8.1)
RBC # BLD AUTO: 4.37 MILLION/UL (ref 4.2–5.8)
SODIUM SERPL-SCNC: 136 MMOL/L (ref 135–146)
TRIGL SERPL-MCNC: 64 MG/DL
TSH SERPL-ACNC: 1.09 MIU/L (ref 0.4–4.5)
WBC # BLD AUTO: 5.3 THOUSAND/UL (ref 3.8–10.8)

## 2023-08-21 ENCOUNTER — TELEPHONE (OUTPATIENT)
Dept: UROLOGY | Facility: CLINIC | Age: 68
End: 2023-08-21

## 2023-08-28 ENCOUNTER — TELEPHONE (OUTPATIENT)
Dept: UROLOGY | Facility: CLINIC | Age: 68
End: 2023-08-28

## 2023-08-28 NOTE — TELEPHONE ENCOUNTER
Called patient and left a vm to inform him that we will need to reschedule his appointment on 9/1/23 at 7:30 with Dr. Pinky Lesch due to the provider being out of the office. Informed pt we would call him back with a new reschedule date.

## 2023-09-13 ENCOUNTER — TELEPHONE (OUTPATIENT)
Dept: UROLOGY | Facility: AMBULATORY SURGERY CENTER | Age: 68
End: 2023-09-13

## 2023-09-13 DIAGNOSIS — C61 PROSTATE CANCER (HCC): Primary | ICD-10-CM

## 2023-09-13 NOTE — TELEPHONE ENCOUNTER
Called patient to schedule a 6 month f/u w a PSA ptv with Dr. Daxa Velazco. Pt was scheduled for 10/27/23 at 10:45 at [facility].

## 2023-10-24 ENCOUNTER — TELEPHONE (OUTPATIENT)
Dept: UROLOGY | Facility: CLINIC | Age: 68
End: 2023-10-24

## 2023-10-24 NOTE — TELEPHONE ENCOUNTER
Called and left detailed VM for patient that script for PSA is in his chart and to please ensure he has blood work completed prior to upcoming appointment with Dr. Arvin Staley on Friday.

## 2023-11-08 DIAGNOSIS — I10 ESSENTIAL HYPERTENSION: ICD-10-CM

## 2023-11-08 RX ORDER — LISINOPRIL 20 MG/1
TABLET ORAL
Qty: 90 TABLET | Refills: 3 | Status: SHIPPED | OUTPATIENT
Start: 2023-11-08

## 2024-02-02 NOTE — ASSESSMENT & PLAN NOTE
Continue Simvastatin 80 mg daily  No - the patient is unable to be screened due to medical condition

## 2024-02-21 PROBLEM — Z00.00 WELL ADULT EXAM: Status: RESOLVED | Noted: 2020-06-11 | Resolved: 2024-02-21

## 2024-10-30 DIAGNOSIS — I10 ESSENTIAL HYPERTENSION: ICD-10-CM

## 2024-10-30 RX ORDER — LISINOPRIL 20 MG/1
TABLET ORAL
Qty: 90 TABLET | Refills: 1 | Status: SHIPPED | OUTPATIENT
Start: 2024-10-30

## 2024-11-13 ENCOUNTER — TELEPHONE (OUTPATIENT)
Dept: FAMILY MEDICINE CLINIC | Facility: CLINIC | Age: 69
End: 2024-11-13

## 2024-12-03 NOTE — TELEPHONE ENCOUNTER
Spoke with [facility], they are not aware of any paper work needing to be filled out as he has not been seen by them.    We will have to wait to see if they resend paper work to office.

## 2025-05-12 DIAGNOSIS — I10 ESSENTIAL HYPERTENSION: ICD-10-CM

## 2025-05-12 DIAGNOSIS — I10 PRIMARY HYPERTENSION: Primary | ICD-10-CM

## 2025-05-12 DIAGNOSIS — C61 PROSTATE CA (HCC): ICD-10-CM

## 2025-05-12 DIAGNOSIS — E78.2 MIXED HYPERLIPIDEMIA: ICD-10-CM

## 2025-05-12 DIAGNOSIS — Z12.5 PROSTATE CANCER SCREENING: ICD-10-CM

## 2025-05-12 DIAGNOSIS — Z13.1 SCREENING FOR DIABETES MELLITUS: ICD-10-CM

## 2025-05-12 DIAGNOSIS — Z13.29 SCREENING FOR THYROID DISORDER: ICD-10-CM

## 2025-05-12 RX ORDER — LISINOPRIL 20 MG/1
20 TABLET ORAL DAILY
Qty: 30 TABLET | Refills: 0 | Status: SHIPPED | OUTPATIENT
Start: 2025-05-12

## 2025-06-10 ENCOUNTER — TELEPHONE (OUTPATIENT)
Age: 70
End: 2025-06-10

## 2025-06-17 LAB
ALBUMIN SERPL-MCNC: 4 G/DL (ref 3.6–5.1)
ALBUMIN/GLOB SERPL: 1.4 (CALC) (ref 1–2.5)
ALP SERPL-CCNC: 90 U/L (ref 35–144)
ALT SERPL-CCNC: 12 U/L (ref 9–46)
AST SERPL-CCNC: 14 U/L (ref 10–35)
BASOPHILS # BLD AUTO: 27 CELLS/UL (ref 0–200)
BASOPHILS NFR BLD AUTO: 0.4 %
BILIRUB SERPL-MCNC: 0.5 MG/DL (ref 0.2–1.2)
BUN SERPL-MCNC: 13 MG/DL (ref 7–25)
BUN/CREAT SERPL: NORMAL (CALC) (ref 6–22)
CALCIUM SERPL-MCNC: 9.2 MG/DL (ref 8.6–10.3)
CHLORIDE SERPL-SCNC: 102 MMOL/L (ref 98–110)
CHOLEST SERPL-MCNC: 181 MG/DL
CHOLEST/HDLC SERPL: 3.6 (CALC)
CO2 SERPL-SCNC: 30 MMOL/L (ref 20–32)
CREAT SERPL-MCNC: 0.75 MG/DL (ref 0.7–1.35)
EOSINOPHIL # BLD AUTO: 221 CELLS/UL (ref 15–500)
EOSINOPHIL NFR BLD AUTO: 3.3 %
ERYTHROCYTE [DISTWIDTH] IN BLOOD BY AUTOMATED COUNT: 12 % (ref 11–15)
EST. AVERAGE GLUCOSE BLD GHB EST-MCNC: 108 MG/DL
EST. AVERAGE GLUCOSE BLD GHB EST-SCNC: 6 MMOL/L
GFR/BSA.PRED SERPLBLD CYS-BASED-ARV: 98 ML/MIN/1.73M2
GLOBULIN SER CALC-MCNC: 2.8 G/DL (CALC) (ref 1.9–3.7)
GLUCOSE SERPL-MCNC: 93 MG/DL (ref 65–99)
HBA1C MFR BLD: 5.4 %
HCT VFR BLD AUTO: 42.1 % (ref 38.5–50)
HDLC SERPL-MCNC: 50 MG/DL
HGB BLD-MCNC: 13.7 G/DL (ref 13.2–17.1)
LDLC SERPL CALC-MCNC: 115 MG/DL (CALC)
LYMPHOCYTES # BLD AUTO: 1668 CELLS/UL (ref 850–3900)
LYMPHOCYTES NFR BLD AUTO: 24.9 %
MCH RBC QN AUTO: 31.7 PG (ref 27–33)
MCHC RBC AUTO-ENTMCNC: 32.5 G/DL (ref 32–36)
MCV RBC AUTO: 97.5 FL (ref 80–100)
MONOCYTES # BLD AUTO: 690 CELLS/UL (ref 200–950)
MONOCYTES NFR BLD AUTO: 10.3 %
NEUTROPHILS # BLD AUTO: 4094 CELLS/UL (ref 1500–7800)
NEUTROPHILS NFR BLD AUTO: 61.1 %
NONHDLC SERPL-MCNC: 131 MG/DL (CALC)
PLATELET # BLD AUTO: 264 THOUSAND/UL (ref 140–400)
PMV BLD REES-ECKER: 9.7 FL (ref 7.5–12.5)
POTASSIUM SERPL-SCNC: 4 MMOL/L (ref 3.5–5.3)
PROT SERPL-MCNC: 6.8 G/DL (ref 6.1–8.1)
PSA SERPL-MCNC: 1.07 NG/ML
RBC # BLD AUTO: 4.32 MILLION/UL (ref 4.2–5.8)
SODIUM SERPL-SCNC: 139 MMOL/L (ref 135–146)
TRIGL SERPL-MCNC: 67 MG/DL
TSH SERPL-ACNC: 1.26 MIU/L (ref 0.4–4.5)
WBC # BLD AUTO: 6.7 THOUSAND/UL (ref 3.8–10.8)

## 2025-06-27 ENCOUNTER — OFFICE VISIT (OUTPATIENT)
Dept: FAMILY MEDICINE CLINIC | Facility: CLINIC | Age: 70
End: 2025-06-27
Payer: COMMERCIAL

## 2025-06-27 VITALS
BODY MASS INDEX: 25.33 KG/M2 | DIASTOLIC BLOOD PRESSURE: 66 MMHG | WEIGHT: 157.6 LBS | OXYGEN SATURATION: 99 % | RESPIRATION RATE: 18 BRPM | TEMPERATURE: 97.2 F | SYSTOLIC BLOOD PRESSURE: 124 MMHG | HEART RATE: 62 BPM | HEIGHT: 66 IN

## 2025-06-27 DIAGNOSIS — I10 PRIMARY HYPERTENSION: ICD-10-CM

## 2025-06-27 DIAGNOSIS — Z12.5 PROSTATE CANCER SCREENING: ICD-10-CM

## 2025-06-27 DIAGNOSIS — K51.40 PSEUDOPOLYP OF ASCENDING COLON WITHOUT COMPLICATION (HCC): ICD-10-CM

## 2025-06-27 DIAGNOSIS — R39.12 BENIGN PROSTATIC HYPERPLASIA WITH WEAK URINARY STREAM: ICD-10-CM

## 2025-06-27 DIAGNOSIS — E78.2 MIXED HYPERLIPIDEMIA: ICD-10-CM

## 2025-06-27 DIAGNOSIS — H91.8X3 OTHER SPECIFIED HEARING LOSS OF BOTH EARS: ICD-10-CM

## 2025-06-27 DIAGNOSIS — N40.1 BENIGN PROSTATIC HYPERPLASIA WITH WEAK URINARY STREAM: ICD-10-CM

## 2025-06-27 DIAGNOSIS — Z85.46 HISTORY OF PROSTATE CANCER: ICD-10-CM

## 2025-06-27 DIAGNOSIS — Z98.61 CAD S/P PERCUTANEOUS CORONARY ANGIOPLASTY: ICD-10-CM

## 2025-06-27 DIAGNOSIS — Z00.00 WELCOME TO MEDICARE PREVENTIVE VISIT: Primary | ICD-10-CM

## 2025-06-27 DIAGNOSIS — I25.10 CAD S/P PERCUTANEOUS CORONARY ANGIOPLASTY: ICD-10-CM

## 2025-06-27 DIAGNOSIS — I10 ESSENTIAL HYPERTENSION: ICD-10-CM

## 2025-06-27 PROBLEM — E66.9 MILD OBESITY: Status: RESOLVED | Noted: 2019-06-20 | Resolved: 2025-06-27

## 2025-06-27 PROBLEM — R63.4 UNINTENTIONAL WEIGHT LOSS: Status: RESOLVED | Noted: 2023-05-31 | Resolved: 2025-06-27

## 2025-06-27 PROBLEM — K63.5 COLON POLYPS: Status: ACTIVE | Noted: 2025-06-27

## 2025-06-27 PROBLEM — K62.5 RECTAL BLEEDING: Status: RESOLVED | Noted: 2022-06-02 | Resolved: 2025-06-27

## 2025-06-27 PROBLEM — S46.011A TRAUMATIC TEAR OF RIGHT ROTATOR CUFF: Status: RESOLVED | Noted: 2021-04-29 | Resolved: 2025-06-27

## 2025-06-27 PROBLEM — N39.8 VOIDING DYSFUNCTION: Status: RESOLVED | Noted: 2022-03-02 | Resolved: 2025-06-27

## 2025-06-27 PROCEDURE — G0402 INITIAL PREVENTIVE EXAM: HCPCS | Performed by: NURSE PRACTITIONER

## 2025-06-27 PROCEDURE — G0403 EKG FOR INITIAL PREVENT EXAM: HCPCS | Performed by: NURSE PRACTITIONER

## 2025-06-27 RX ORDER — LISINOPRIL 20 MG/1
20 TABLET ORAL DAILY
Qty: 100 TABLET | Refills: 2 | Status: SHIPPED | OUTPATIENT
Start: 2025-06-27

## 2025-06-27 NOTE — ASSESSMENT & PLAN NOTE
Blood pressure in the office today is 124/66.  He continues on lisinopril 20 mg daily. Low salt diet recommended

## 2025-06-27 NOTE — ASSESSMENT & PLAN NOTE
Patient was on zocor and stopped as he did not want to continue on a statin.  Recent lipid panel reviewed.  Continue low fat diet. Make an appointment to follow with cardiology       Component      Latest Ref Rng 6/17/2025   Cholesterol      <200 mg/dL 181    HDL      > OR = 40 mg/dL 50    Triglycerides      <150 mg/dL 67    LDL Calculated      mg/dL (calc) 115 (H)    Chol/HDL Ratio      <5.0 (calc) 3.6    Non-HDL Cholesterol      <130 mg/dL (calc) 131 (H)       Legend:  (H) High    Orders:    Lipid panel

## 2025-06-27 NOTE — ASSESSMENT & PLAN NOTE
Overdue for follow up with urology. Most recent PSA normal. Recommend patient make appointment with urology.  Lab Results   Component Value Date    PSA 1.07 06/17/2025    PSA 0.60 01/27/2023    PSA 1.6 10/28/2022       2/10/2023  IMPRESSION:     1. PI-RADSv2.1 Category 2 - Low (clinically significant cancer is unlikely to be present).     2. No extraprostatic tumor, seminal vesicle invasion, pelvic lymphadenopathy, or pelvic osseous metastatic disease.     3. 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

## 2025-06-27 NOTE — PROGRESS NOTES
Name: Jadon Sandoval      : 1955      MRN: [id number]  Encounter Provider: ESA Wayne  Encounter Date: 2025   Encounter department: [facility]  :  Chief Complaint   Patient presents with    Medicare Wellness Visit     Welcome to Medicare     Health Maintenance   Topic Date Due    Medicare Annual Wellness Visit (AWV)  Never done    Zoster Vaccine (1 of 2) Never done    Pneumococcal Vaccine: 50+ Years (1 of 1 - PCV) Never done    Colorectal Cancer Screening  2023    Fall Risk  2024    Depression Screening  2024    COVID-19 Vaccine ( -  season) Never done    Influenza Vaccine (Season Ended) 2025    RSV Vaccine for Pregnant Patients and Patients Age 60+ Years (1 - 1-dose 75+ series) 2030    Hepatitis C Screening  Completed    Meningococcal B Vaccine  Aged Out    RSV Vaccine age 0-20 Months  Aged Out    HIB Vaccine  Aged Out    IPV Vaccine  Aged Out    Hepatitis A Vaccine  Aged Out    Meningococcal ACWY Vaccine  Aged Out    HPV Vaccine  Aged Out     Assessment & Plan  Welcome to Medicare preventive visit    Orders:    POCT ECG    History of prostate cancer  Overdue for follow up with urology. Most recent PSA normal. Recommend patient make appointment with urology.  Lab Results   Component Value Date    PSA 1.07 2025    PSA 0.60 2023    PSA 1.6 10/28/2022       2/10/2023  IMPRESSION:     1. PI-RADSv2.1 Category 2 - Low (clinically significant cancer is unlikely to be present).     2. No extraprostatic tumor, seminal vesicle invasion, pelvic lymphadenopathy, or pelvic osseous metastatic disease.     3. 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       Mixed hyperlipidemia  Patient was on zocor and stopped as he did not want to continue on a statin.  Recent lipid panel reviewed.  Continue low fat diet. Make an appointment to follow with cardiology       Component      Latest Ref Rng 2025   Cholesterol       <200 mg/dL 181    HDL      > OR = 40 mg/dL 50    Triglycerides      <150 mg/dL 67    LDL Calculated      mg/dL (calc) 115 (H)    Chol/HDL Ratio      <5.0 (calc) 3.6    Non-HDL Cholesterol      <130 mg/dL (calc) 131 (H)       Legend:  (H) High    Orders:    Lipid panel    Other specified hearing loss of both ears    Orders:    Ambulatory Referral to Audiology; Future    Essential hypertension    Orders:    lisinopril (ZESTRIL) 20 mg tablet; Take 1 tablet (20 mg total) by mouth daily    CBC and differential    Comprehensive metabolic panel    Prostate cancer screening    Orders:    PSA, Total Screen    CAD S/P percutaneous coronary angioplasty  Patient is not following with cardiology.  He continues on aspirin daily.  He stopped his statin and does not want to continue to take this medication.           Primary hypertension  Blood pressure in the office today is 124/66.  He continues on lisinopril 20 mg daily. 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  3 year recall recommended.  Referral to colorectal placed.     Orders:    Ambulatory Referral to Colorectal Surgery; Future    Benign prostatic hyperplasia with weak urinary stream  Overdue for follow up with urology. He had been on cialis and flomax but stopped these on his own. Recommend patient make an appointment with urology            Depression Screening and Follow-up Plan: Patient was screened for depression during today's encounter. They screened negative with a PHQ-2 score of 0.        Preventive health issues were discussed with patient, and age appropriate screening tests were ordered as noted in patient's After Visit Summary. Personalized health advice and appropriate referrals for health education or preventive services given if needed, as noted in patient's After Visit Summary.    History of Present Illness     Jadon presents to the office today for welcome to  medicare visit. Ongoing concern of bilateral shoulder discomfort first thing in morning, resolves after activity. Recent blood work reviewed. Declines vaccines. Needs to make appointment with cardiology and urology. Overdue for surveillance colonoscopy       Patient Care Team:  ESA Obando as PCP - General (Internal Medicine)    Review of Systems   Constitutional: Negative.  Negative for fatigue.   HENT: Negative.  Negative for congestion, postnasal drip, rhinorrhea and trouble swallowing.    Eyes: Negative.  Negative for visual disturbance.   Respiratory: Negative.  Negative for choking and shortness of breath.    Cardiovascular: Negative.  Negative for chest pain.   Gastrointestinal: Negative.    Endocrine: Negative.    Genitourinary: Negative.    Musculoskeletal:  Positive for arthralgias. Negative for back pain, myalgias and neck pain.   Skin: Negative.    Neurological:  Negative for dizziness and headaches.   Psychiatric/Behavioral: Negative.       Medical History Reviewed by provider this encounter:       Annual Wellness Visit Questionnaire   Jadon is here for his Welcome to Medicare visit.     Health Risk Assessment:   Patient rates overall health as very good. Patient feels that their physical health rating is same. Patient is very satisfied with their life. Eyesight was rated as same. Hearing was rated as same. Patient feels that their emotional and mental health rating is same. Patients states they are never, rarely angry. Patient states they are sometimes unusually tired/fatigued. Pain experienced in the last 7 days has been some. Patient's pain rating has been 4/10. Patient states that he has experienced no weight loss or gain in last 6 months.     Depression Screening:   PHQ-2 Score: 0      Fall Risk Screening:   In the past year, patient has experienced: no history of falling in past year      Home Safety:  Patient has trouble with stairs inside or outside of their home. Patient has working  smoke alarms and has working carbon monoxide detector. Home safety hazards include: none.     Nutrition:   Current diet is Low Cholesterol and Regular.     Medications:   Patient is currently taking over-the-counter supplements. OTC medications include: see medication list. Patient is able to manage medications.     Activities of Daily Living (ADLs)/Instrumental Activities of Daily Living (IADLs):   Walk and transfer into and out of bed and chair?: Yes  Dress and groom yourself?: Yes    Bathe or shower yourself?: Yes    Feed yourself? Yes  Do your laundry/housekeeping?: Yes  Manage your money, pay your bills and track your expenses?: Yes  Make your own meals?: Yes    Do your own shopping?: Yes    Previous Hospitalizations:   Any hospitalizations or ED visits within the last 12 months?: No      Advance Care Planning:   Living will: No    Durable POA for healthcare: No    Advanced directive: No    ACP document given: Yes      Preventive Screenings      Cardiovascular Screening:    General: History Lipid Disorder and Screening Current      Diabetes Screening:     General: Screening Current      Colorectal Cancer Screening:     General: Screening Current      Prostate Cancer Screening:    General: History Prostate Cancer and Screening Current      Osteoporosis Screening:    General: Screening Not Indicated      Abdominal Aortic Aneurysm (AAA) Screening:    Risk factors include: age between 65-74 yo and tobacco use        General: Screening Not Indicated      Lung Cancer Screening:     General: Screening Not Indicated      Hepatitis C Screening:    General: Screening Current    Immunizations:  - Immunizations due: Prevnar 20 and Zoster (Shingrix)  - The patient declines recommended vaccines currently despite my recommendations      Screening, Brief Intervention, and Referral to Treatment (SBIRT)     Screening  Typical number of drinks in a day: 0  Typical number of drinks in a week: 0  Interpretation: Low risk drinking  "behavior.    Single Item Drug Screening:  How often have you used an illegal drug (including marijuana) or a prescription medication for non-medical reasons in the past year? never    Single Item Drug Screen Score: 0  Interpretation: Negative screen for possible drug use disorder       No results found.    Objective   /66   Pulse 62   Temp (!) 97.2 °F (36.2 °C) (Temporal)   Resp 18   Ht 5' 6\" (1.676 m)   Wt 71.5 kg (157 lb 9.6 oz)   SpO2 99%   BMI 25.44 kg/m²     Physical Exam  Vitals reviewed.   Constitutional:       Appearance: Normal appearance.   HENT:      Head: Normocephalic and atraumatic.      Nose: Nose normal.      Mouth/Throat:      Mouth: Mucous membranes are moist.     Eyes:      Extraocular Movements: Extraocular movements intact.      Pupils: Pupils are equal, round, and reactive to light.       Cardiovascular:      Rate and Rhythm: Normal rate and regular rhythm.      Pulses: Normal pulses.      Heart sounds: Normal heart sounds.   Pulmonary:      Effort: Pulmonary effort is normal.      Breath sounds: Normal breath sounds.     Musculoskeletal:         General: Normal range of motion.     Skin:     General: Skin is warm.      Capillary Refill: Capillary refill takes less than 2 seconds.     Neurological:      General: No focal deficit present.      Mental Status: He is alert and oriented to person, place, and time. Mental status is at baseline.     Psychiatric:         Mood and Affect: Mood normal.         Behavior: Behavior normal.         Thought Content: Thought content normal.         Judgment: Judgment normal.         "

## 2025-06-27 NOTE — ASSESSMENT & PLAN NOTE
Overdue for follow up with urology. He had been on cialis and flomax but stopped these on his own. Recommend patient make an appointment with urology

## 2025-06-27 NOTE — PATIENT INSTRUCTIONS
Medicare Preventive Visit Patient Instructions  Thank you for completing your Welcome to Medicare Visit or Medicare Annual Wellness Visit today. Your next wellness visit will be due in one year (6/28/2026).  The screening/preventive services that you may require over the next 5-10 years are detailed below. Some tests may not apply to you based off risk factors and/or age. Screening tests ordered at today's visit but not completed yet may show as past due. Also, please note that scanned in results may not display below.  Preventive Screenings:  Service Recommendations Previous Testing/Comments   Colorectal Cancer Screening  Colonoscopy    Fecal Occult Blood Test (FOBT)/Fecal Immunochemical Test (FIT)  Fecal DNA/Cologuard Test  Flexible Sigmoidoscopy Age: 45-75 years old   Colonoscopy: every 10 years (May be performed more frequently if at higher risk)  OR  FOBT/FIT: every 1 year  OR  Cologuard: every 3 years  OR  Sigmoidoscopy: every 5 years  Screening may be recommended earlier than age 45 if at higher risk for colorectal cancer. Also, an individualized decision between you and your healthcare provider will decide whether screening between the ages of 76-85 would be appropriate. Colonoscopy: 07/30/2020  FOBT/FIT: Not on file  Cologuard: Not on file  Sigmoidoscopy: Not on file    Screening Current     Prostate Cancer Screening Individualized decision between patient and health care provider in men between ages of 55-69   Medicare will cover every 12 months beginning on the day after your 50th birthday PSA: 1.07 ng/mL     History Prostate Cancer     Hepatitis C Screening Once for adults born between 1945 and 1965  More frequently in patients at high risk for Hepatitis C Hep C Antibody: 10/28/2022    Screening Current   Diabetes Screening 1-2 times per year if you're at risk for diabetes or have pre-diabetes Fasting glucose: 100 mg/dL (10/28/2022)  A1C: 5.4 % (6/17/2025)  Screening Current   Cholesterol Screening Once  every 5 years if you don't have a lipid disorder. May order more often based on risk factors. Lipid panel: 06/17/2025  Screening Not Indicated  History Lipid Disorder      Other Preventive Screenings Covered by Medicare:  Abdominal Aortic Aneurysm (AAA) Screening: covered once if your at risk. You're considered to be at risk if you have a family history of AAA or a male between the age of 65-75 who smoking at least 100 cigarettes in your lifetime.  Lung Cancer Screening: covers low dose CT scan once per year if you meet all of the following conditions: (1) Age 55-77; (2) No signs or symptoms of lung cancer; (3) Current smoker or have quit smoking within the last 15 years; (4) You have a tobacco smoking history of at least 20 pack years (packs per day x number of years you smoked); (5) You get a written order from a healthcare provider.  Glaucoma Screening: covered annually if you're considered high risk: (1) You have diabetes OR (2) Family history of glaucoma OR (3)  aged 50 and older OR (4)  American aged 65 and older  Osteoporosis Screening: covered every 2 years if you meet one of the following conditions: (1) Have a vertebral abnormality; (2) On glucocorticoid therapy for more than 3 months; (3) Have primary hyperparathyroidism; (4) On osteoporosis medications and need to assess response to drug therapy.  HIV Screening: covered annually if you're between the age of 15-65. Also covered annually if you are younger than 15 and older than 65 with risk factors for HIV infection. For pregnant patients, it is covered up to 3 times per pregnancy.    Immunizations:  Immunization Recommendations   Influenza Vaccine Annual influenza vaccination during flu season is recommended for all persons aged >= 6 months who do not have contraindications   Pneumococcal Vaccine   * Pneumococcal conjugate vaccine = PCV13 (Prevnar 13), PCV15 (Vaxneuvance), PCV20 (Prevnar 20)  * Pneumococcal polysaccharide vaccine  = PPSV23 (Pneumovax) Adults 19-65 yo with certain risk factors or if 65+ yo  If never received any pneumonia vaccine: recommend Prevnar 20 (PCV20)  Give PCV20 if previously received 1 dose of PCV13 or PPSV23   Hepatitis B Vaccine 3 dose series if at intermediate or high risk (ex: diabetes, end stage renal disease, liver disease)   Respiratory syncytial virus (RSV) Vaccine - COVERED BY MEDICARE PART D  * RSVPreF3 (Arexvy) CDC recommends that adults 60 years of age and older may receive a single dose of RSV vaccine using shared clinical decision-making (SCDM)   Tetanus (Td) Vaccine - COST NOT COVERED BY MEDICARE PART B Following completion of primary series, a booster dose should be given every 10 years to maintain immunity against tetanus. Td may also be given as tetanus wound prophylaxis.   Tdap Vaccine - COST NOT COVERED BY MEDICARE PART B Recommended at least once for all adults. For pregnant patients, recommended with each pregnancy.   Shingles Vaccine (Shingrix) - COST NOT COVERED BY MEDICARE PART B  2 shot series recommended in those 19 years and older who have or will have weakened immune systems or those 50 years and older     Health Maintenance Due:      Topic Date Due    Colorectal Cancer Screening  07/30/2023    Hepatitis C Screening  Completed     Immunizations Due:      Topic Date Due    Pneumococcal Vaccine: 50+ Years (1 of 1 - PCV) Never done    COVID-19 Vaccine (1 - 2024-25 season) Never done    Influenza Vaccine (Season Ended) 09/01/2025     Advance Directives   What are advance directives?  Advance directives are legal documents that state your wishes and plans for medical care. These plans are made ahead of time in case you lose your ability to make decisions for yourself. Advance directives can apply to any medical decision, such as the treatments you want, and if you want to donate organs.   What are the types of advance directives?  There are many types of advance directives, and each state has  rules about how to use them. You may choose a combination of any of the following:  Living will:  This is a written record of the treatment you want. You can also choose which treatments you do not want, which to limit, and which to stop at a certain time. This includes surgery, medicine, IV fluid, and tube feedings.   Durable power of  for healthcare (DPAHC):  This is a written record that states who you want to make healthcare choices for you when you are unable to make them for yourself. This person, called a proxy, is usually a family member or a friend. You may choose more than 1 proxy.  Do not resuscitate (DNR) order:  A DNR order is used in case your heart stops beating or you stop breathing. It is a request not to have certain forms of treatment, such as CPR. A DNR order may be included in other types of advance directives.  Medical directive:  This covers the care that you want if you are in a coma, near death, or unable to make decisions for yourself. You can list the treatments you want for each condition. Treatment may include pain medicine, surgery, blood transfusions, dialysis, IV or tube feedings, and a ventilator (breathing machine).  Values history:  This document has questions about your views, beliefs, and how you feel and think about life. This information can help others choose the care that you would choose.  Why are advance directives important?  An advance directive helps you control your care. Although spoken wishes may be used, it is better to have your wishes written down. Spoken wishes can be misunderstood, or not followed. Treatments may be given even if you do not want them. An advance directive may make it easier for your family to make difficult choices about your care.   Weight Management   Why it is important to manage your weight:  Being overweight increases your risk of health conditions such as heart disease, high blood pressure, type 2 diabetes, and certain types of  cancer. It can also increase your risk for osteoarthritis, sleep apnea, and other respiratory problems. Aim for a slow, steady weight loss. Even a small amount of weight loss can lower your risk of health problems.  How to lose weight safely:  A safe and healthy way to lose weight is to eat fewer calories and get regular exercise. You can lose up about 1 pound a week by decreasing the number of calories you eat by 500 calories each day.   Healthy meal plan for weight management:  A healthy meal plan includes a variety of foods, contains fewer calories, and helps you stay healthy. A healthy meal plan includes the following:  Eat whole-grain foods more often.  A healthy meal plan should contain fiber. Fiber is the part of grains, fruits, and vegetables that is not broken down by your body. Whole-grain foods are healthy and provide extra fiber in your diet. Some examples of whole-grain foods are whole-wheat breads and pastas, oatmeal, brown rice, and bulgur.  Eat a variety of vegetables every day.  Include dark, leafy greens such as spinach, kale, clarisa greens, and mustard greens. Eat yellow and orange vegetables such as carrots, sweet potatoes, and winter squash.   Eat a variety of fruits every day.  Choose fresh or canned fruit (canned in its own juice or light syrup) instead of juice. Fruit juice has very little or no fiber.  Eat low-fat dairy foods.  Drink fat-free (skim) milk or 1% milk. Eat fat-free yogurt and low-fat cottage cheese. Try low-fat cheeses such as mozzarella and other reduced-fat cheeses.  Choose meat and other protein foods that are low in fat.  Choose beans or other legumes such as split peas or lentils. Choose fish, skinless poultry (chicken or turkey), or lean cuts of red meat (beef or pork). Before you cook meat or poultry, cut off any visible fat.   Use less fat and oil.  Try baking foods instead of frying them. Add less fat, such as margarine, sour cream, regular salad dressing and  "mayonnaise to foods. Eat fewer high-fat foods. Some examples of high-fat foods include french fries, doughnuts, ice cream, and cakes.  Eat fewer sweets.  Limit foods and drinks that are high in sugar. This includes candy, cookies, regular soda, and sweetened drinks.  Exercise:  Exercise at least 30 minutes per day on most days of the week. Some examples of exercise include walking, biking, dancing, and swimming. You can also fit in more physical activity by taking the stairs instead of the elevator or parking farther away from stores. Ask your healthcare provider about the best exercise plan for you.    © Copyright [employer] 2018 Information is for End User's use only and may not be sold, redistributed or otherwise used for commercial purposes. All illustrations and images included in CareNotes® are the copyrighted property of [employer], [employer]. or [employer]      Patient Education     Low-fat diet   The Basics   Written by the doctors and editors at [facility]   What are fats? -- The fat in the food you eat is often classified into 2 groups:   \"Healthy\" fats - These are monounsaturated or polyunsaturated fats. They tend to be more liquid at room temperature. Healthy fats are found in things like olive oil, canola oil, and sesame oil. They are also found in nuts, seeds, avocados, and nut butters.   \"Unhealthy\" fats - These are saturated and trans fats. Saturated fats are animal fats. Trans fats are artificial fats, like partially hydrogenated oils. These fats raise your cholesterol. Unhealthy fats tend to be more solid at room temperature. They are found in meats, egg yolks, butter, cheese, and full-fat milk products. They are also found in some fried foods, butter, margarine, and baked goods like cookies or cakes.  Why do I need a low-fat diet? -- The amounts and kinds of fats you eat can affect your health. This is especially true if you have specific conditions such as blocked arteries or gallbladder " problems. Eating fewer unhealthy fats is 1 way to improve your health.  Some people try to eat less fat because they want to lose weight or avoid gaining weight. But this does not always work. That's because weight gain is related to how many calories you eat, no matter what foods they come from. Eating fewer unhealthy fats can be an important part of a weight loss plan. But it's also important to be aware of how many calories you are getting from other foods.  What can I eat and drink on a low-fat diet? -- Choose foods with healthy fats.  Foods with healthy fats include:   Canola, peanut, and olive oil   Safflower, sunflower, soybean, and corn oil   Walnuts, almonds, pecans, hazelnuts, cashews, and peanuts   Pumpkin, sesame, flax, and sunflower seeds   Lamar, tuna, and some other fish   Tofu   Soy milk   Avocado  Other healthy foods with lower amounts of fats include:   Fat-free (non-fat) or low-fat milk, yogurt, and cheese   Light, low-fat or fat-free cream cheese and sour cream   Dried beans, lentils, and tofu   Fruits and vegetables   Whole-grain breads, cereals, pastas, and rice   High-fiber foods, like oatmeal, fruits, beans, and nuts. These have soluble fiber, which helps lower cholesterol in the body.   Deli ham, turkey, chicken breast, and lean roast beef  What foods and drinks should I limit on a low-fat diet? -- It is important to limit certain foods with unhealthy fats.  Limit these types of foods that have saturated fats:   Whole-fat dairy products like cheese, ice cream, whole milk, and cream   High-fat meats like beef, lamb, poultry with the skin, pappas, hot dogs, and sausage   Processed deli meats like bologna, pepperoni, and salami   Butter and lard   Palm and coconut oils   Mayonnaise, salad dressings, gravies, and sauces  Limit these types of foods that might have trans fats:   Granola, candy, and baked goods like cookies, cakes, doughnuts, and muffins   Pizza dough, and pie crusts that are  "packaged   Fried foods   Frozen dinners   Chips and crackers   Microwave popcorn   Stick margarine and vegetable shortenings  What else should I know? -- You do not have to remove all fat from your diet. Instead, pay attention to the amount and kinds of fats that you eat.   Read the labels of store-bought foods (figure 1) to find out how much fat is in each. Under 5 percent of total fat on a label means that it is \"low fat.\" Over 20 percent of total fat on a label means that it is \"high fat.\" Avoid foods with \"partially hydrogenated oil\" in the ingredient list. This means that there is trans fat in the food.   Change how you cook to help lower the amount of fat in your food:   Remove the fatty parts of meat and the skin from poultry before cooking   Bake, broil, grill, poach, or roast poultry, fish, and lean meats   Drain and throw away the fat that comes out of meat as you cook it   Try to add little or no fat to foods   Use olive or canola oil for cooking or baking   Steam your vegetables   Use herbs or no-oil marinades to flavor foods  All topics are updated as new evidence becomes available and our peer review process is complete.  This topic retrieved from [employer] on: Mar 13, 2024.  Topic 261429 Version 4.0  Release: 32.2.4 - C32.71  © 2024 UpToDate, Inc. and/or its affiliates. All rights reserved.  figure 1: Nutrition label - Fats     This is an example of a nutrition label. To figure out how much and what kinds of fats are in a food, look for the lines that say \"Saturated Fat\" and \"Trans Fat.\" It's also important to look at the serving size. This food has 3 grams of saturated fat and 2 grams of trans fat in each serving, and each serving is 2 tablespoons (32 grams).  Graphic 703484 Version 1.0  Consumer Information Use and Disclaimer   Disclaimer: This generalized information is a limited summary of diagnosis, treatment, and/or medication information. It is not meant to be comprehensive and should be used as " a tool to help the user understand and/or assess potential diagnostic and treatment options. It does NOT include all information about conditions, treatments, medications, side effects, or risks that may apply to a specific patient. It is not intended to be medical advice or a substitute for the medical advice, diagnosis, or treatment of a health care provider based on the health care provider's examination and assessment of a patient's specific and unique circumstances. Patients must speak with a health care provider for complete information about their health, medical questions, and treatment options, including any risks or benefits regarding use of medications. This information does not endorse any treatments or medications as safe, effective, or approved for treating a specific patient. UpToDate, Inc. and its affiliates disclaim any warranty or liability relating to this information or the use thereof.The use of this information is governed by the Terms of Use, available at https://www.[employer].com/en/know/clinical-effectiveness-terms. 2024© UpToDate, Inc. and its affiliates and/or licensors. All rights reserved.  Copyright   © 2024 UpToDate, Inc. and/or its affiliates. All rights reserved.

## 2025-06-27 NOTE — ASSESSMENT & PLAN NOTE
Patient is not following with cardiology.  He continues on aspirin daily.  He stopped his statin and does not want to continue to take this medication.

## 2025-06-27 NOTE — ASSESSMENT & PLAN NOTE
Last colonoscopy in December of 2020.  3 year recall recommended.  Referral to colorectal placed.     Orders:    Ambulatory Referral to Colorectal Surgery; Future

## (undated) DEVICE — HF-RESECTION ELECTRODE PLASMABUTTON BUTTON, 24 FR., 12°-30°, ESG TURIS: Brand: OLYMPUS

## (undated) DEVICE — Device

## (undated) DEVICE — PACK TUR

## (undated) DEVICE — BAG URINE DRAINAGE 4000ML CONTINUOUS IRR

## (undated) DEVICE — INTENDED FOR TISSUE SEPARATION, AND OTHER PROCEDURES THAT REQUIRE A SHARP SURGICAL BLADE TO PUNCTURE OR CUT.: Brand: BARD-PARKER ®  SAFETY SCALPED

## (undated) DEVICE — CYSTO TUBING SINGLE IRRIGATION

## (undated) DEVICE — 4-PORT MANIFOLD: Brand: NEPTUNE 2

## (undated) DEVICE — GLOVE SRG BIOGEL 8

## (undated) DEVICE — CYSTO TUBING TUR Y IRRIGATION

## (undated) DEVICE — STERILE SURGICAL LUBRICANT,  TUBE: Brand: SURGILUBE

## (undated) DEVICE — EVACUATOR BLADDER ELLIK DISP STRL

## (undated) DEVICE — UROCATCH BAG

## (undated) DEVICE — TUBING SUCTION 5MM X 12 FT

## (undated) DEVICE — BASIC SINGLE BASIN-LF: Brand: MEDLINE INDUSTRIES, INC.

## (undated) DEVICE — SCD SEQUENTIAL COMPRESSION COMFORT SLEEVE MEDIUM KNEE LENGTH: Brand: KENDALL SCD

## (undated) DEVICE — HF-RESECTION ELECTRODE PLASMALOOP LOOP, MEDIUM, 24 FR., 12°-30°, ESG TURIS: Brand: OLYMPUS